# Patient Record
Sex: MALE | Race: WHITE | NOT HISPANIC OR LATINO | Employment: OTHER | URBAN - METROPOLITAN AREA
[De-identification: names, ages, dates, MRNs, and addresses within clinical notes are randomized per-mention and may not be internally consistent; named-entity substitution may affect disease eponyms.]

---

## 2017-07-17 ENCOUNTER — HISTORICAL (OUTPATIENT)
Dept: RADIOLOGY | Facility: HOSPITAL | Age: 82
End: 2017-07-17

## 2018-01-02 ENCOUNTER — HISTORICAL (OUTPATIENT)
Dept: RADIOLOGY | Facility: HOSPITAL | Age: 83
End: 2018-01-02

## 2018-03-26 ENCOUNTER — HISTORICAL (OUTPATIENT)
Dept: RADIOLOGY | Facility: HOSPITAL | Age: 83
End: 2018-03-26

## 2019-07-15 ENCOUNTER — HISTORICAL (OUTPATIENT)
Dept: RADIOLOGY | Facility: HOSPITAL | Age: 84
End: 2019-07-15

## 2019-09-03 ENCOUNTER — HISTORICAL (OUTPATIENT)
Dept: RADIOLOGY | Facility: HOSPITAL | Age: 84
End: 2019-09-03

## 2019-09-11 ENCOUNTER — HISTORICAL (OUTPATIENT)
Dept: RADIOLOGY | Facility: HOSPITAL | Age: 84
End: 2019-09-11

## 2020-08-04 ENCOUNTER — HISTORICAL (OUTPATIENT)
Dept: RADIOLOGY | Facility: HOSPITAL | Age: 85
End: 2020-08-04

## 2022-07-12 ENCOUNTER — HOSPITAL ENCOUNTER (OUTPATIENT)
Facility: HOSPITAL | Age: 87
Discharge: HOME | End: 2022-07-14
Attending: INTERNAL MEDICINE | Admitting: INTERNAL MEDICINE
Payer: MEDICARE

## 2022-07-12 DIAGNOSIS — A41.9 SEPSIS, DUE TO UNSPECIFIED ORGANISM, UNSPECIFIED WHETHER ACUTE ORGAN DYSFUNCTION PRESENT: ICD-10-CM

## 2022-07-12 DIAGNOSIS — R01.1 MURMUR, CARDIAC: ICD-10-CM

## 2022-07-12 DIAGNOSIS — A41.9 SEPSIS: ICD-10-CM

## 2022-07-12 DIAGNOSIS — R07.9 CHEST PAIN: ICD-10-CM

## 2022-07-12 DIAGNOSIS — K59.00 CONSTIPATION, UNSPECIFIED CONSTIPATION TYPE: Primary | ICD-10-CM

## 2022-07-12 DIAGNOSIS — E86.0 DEHYDRATION: ICD-10-CM

## 2022-07-12 DIAGNOSIS — I95.9 HYPOTENSION: ICD-10-CM

## 2022-07-12 DIAGNOSIS — K59.00 CONSTIPATION: ICD-10-CM

## 2022-07-12 PROBLEM — H35.30 MACULAR DEGENERATION: Status: ACTIVE | Noted: 2022-07-12

## 2022-07-12 PROBLEM — I10 HYPERTENSION: Status: ACTIVE | Noted: 2022-07-12

## 2022-07-12 PROBLEM — I65.29 OBSTRUCTION OF CAROTID ARTERY: Status: ACTIVE | Noted: 2022-07-12

## 2022-07-12 PROBLEM — E78.5 HYPERLIPIDEMIA: Status: ACTIVE | Noted: 2022-07-12

## 2022-07-12 PROBLEM — E03.9 HYPOTHYROIDISM: Status: ACTIVE | Noted: 2022-07-12

## 2022-07-12 LAB
ALBUMIN SERPL-MCNC: 3 GM/DL (ref 3.4–4.8)
ALBUMIN/GLOB SERPL: 0.9 RATIO (ref 1.1–2)
ALP SERPL-CCNC: 94 UNIT/L (ref 40–150)
ALT SERPL-CCNC: 25 UNIT/L (ref 0–55)
APPEARANCE UR: CLEAR
AST SERPL-CCNC: 21 UNIT/L (ref 5–34)
BACTERIA #/AREA URNS AUTO: NORMAL /HPF
BASOPHILS # BLD AUTO: 0.03 X10(3)/MCL (ref 0–0.2)
BASOPHILS NFR BLD AUTO: 0.2 %
BILIRUB UR QL STRIP.AUTO: NEGATIVE MG/DL
BILIRUBIN DIRECT+TOT PNL SERPL-MCNC: 0.7 MG/DL
BUN SERPL-MCNC: 42 MG/DL (ref 8.4–25.7)
CALCIUM SERPL-MCNC: 8.6 MG/DL (ref 8.8–10)
CHLORIDE SERPL-SCNC: 114 MMOL/L (ref 98–107)
CO2 SERPL-SCNC: 22 MMOL/L (ref 23–31)
COLOR UR AUTO: YELLOW
CREAT SERPL-MCNC: 1.56 MG/DL (ref 0.73–1.18)
EOSINOPHIL # BLD AUTO: 0.04 X10(3)/MCL (ref 0–0.9)
EOSINOPHIL NFR BLD AUTO: 0.3 %
ERYTHROCYTE [DISTWIDTH] IN BLOOD BY AUTOMATED COUNT: 16.9 % (ref 11.5–17)
FERRITIN SERPL-MCNC: 648.53 NG/ML (ref 21.81–274.66)
FLUAV AG UPPER RESP QL IA.RAPID: NOT DETECTED
FLUBV AG UPPER RESP QL IA.RAPID: NOT DETECTED
FOLATE SERPL-MCNC: 16.3 NG/ML (ref 7–31.4)
GLOBULIN SER-MCNC: 3.5 GM/DL (ref 2.4–3.5)
GLUCOSE SERPL-MCNC: 135 MG/DL (ref 82–115)
GLUCOSE UR QL STRIP.AUTO: NEGATIVE MG/DL
HCT VFR BLD AUTO: 26.1 % (ref 42–52)
HEMOCCULT SP1 STL QL: POSITIVE
HGB BLD-MCNC: 8.3 GM/DL (ref 14–18)
IMM GRANULOCYTES # BLD AUTO: 0.1 X10(3)/MCL (ref 0–0.04)
IMM GRANULOCYTES NFR BLD AUTO: 0.7 %
IRON SATN MFR SERPL: 14 % (ref 20–50)
IRON SERPL-MCNC: 27 UG/DL (ref 65–175)
KETONES UR QL STRIP.AUTO: NEGATIVE MG/DL
LACTATE SERPL-SCNC: 1 MMOL/L (ref 0.5–2.2)
LACTATE SERPL-SCNC: 3.2 MMOL/L (ref 0.5–2.2)
LEUKOCYTE ESTERASE UR QL STRIP.AUTO: NEGATIVE UNIT/L
LYMPHOCYTES # BLD AUTO: 2.52 X10(3)/MCL (ref 0.6–4.6)
LYMPHOCYTES NFR BLD AUTO: 16.7 %
MCH RBC QN AUTO: 32.3 PG (ref 27–31)
MCHC RBC AUTO-ENTMCNC: 31.8 MG/DL (ref 33–36)
MCV RBC AUTO: 101.6 FL (ref 80–94)
MONOCYTES # BLD AUTO: 2.1 X10(3)/MCL (ref 0.1–1.3)
MONOCYTES NFR BLD AUTO: 13.9 %
NEUTROPHILS # BLD AUTO: 10.3 X10(3)/MCL (ref 2.1–9.2)
NEUTROPHILS NFR BLD AUTO: 68.2 %
NITRITE UR QL STRIP.AUTO: NEGATIVE
PH UR STRIP.AUTO: 6.5 [PH]
PLATELET # BLD AUTO: 242 X10(3)/MCL (ref 130–400)
PMV BLD AUTO: 11 FL (ref 7.4–10.4)
POTASSIUM SERPL-SCNC: 5.1 MMOL/L (ref 3.5–5.1)
PROT SERPL-MCNC: 6.5 GM/DL (ref 5.8–7.6)
PROT UR QL STRIP.AUTO: 30 MG/DL
RBC # BLD AUTO: 2.57 X10(6)/MCL (ref 4.7–6.1)
RBC #/AREA URNS AUTO: NORMAL /HPF
RBC UR QL AUTO: NEGATIVE UNIT/L
SARS-COV-2 RNA RESP QL NAA+PROBE: NOT DETECTED
SODIUM SERPL-SCNC: 143 MMOL/L (ref 136–145)
SP GR UR STRIP.AUTO: 1.02
SQUAMOUS #/AREA URNS AUTO: NORMAL /HPF
TIBC SERPL-MCNC: 172 UG/DL (ref 69–240)
TIBC SERPL-MCNC: 199 UG/DL (ref 250–450)
TROPONIN I SERPL-MCNC: <0.01 NG/ML (ref 0–0.04)
TSH SERPL-ACNC: 0.55 UIU/ML (ref 0.35–4.94)
UROBILINOGEN UR STRIP-ACNC: 0.2 MG/DL
WBC # SPEC AUTO: 15.1 X10(3)/MCL (ref 4.5–11.5)
WBC #/AREA URNS AUTO: NORMAL /HPF

## 2022-07-12 PROCEDURE — 82607 VITAMIN B-12: CPT | Performed by: INTERNAL MEDICINE

## 2022-07-12 PROCEDURE — 83540 ASSAY OF IRON: CPT | Performed by: INTERNAL MEDICINE

## 2022-07-12 PROCEDURE — 63600175 PHARM REV CODE 636 W HCPCS: Performed by: INTERNAL MEDICINE

## 2022-07-12 PROCEDURE — C9113 INJ PANTOPRAZOLE SODIUM, VIA: HCPCS | Performed by: INTERNAL MEDICINE

## 2022-07-12 PROCEDURE — 80053 COMPREHEN METABOLIC PANEL: CPT | Performed by: INTERNAL MEDICINE

## 2022-07-12 PROCEDURE — 85060 BLOOD SMEAR INTERPRETATION: CPT | Performed by: INTERNAL MEDICINE

## 2022-07-12 PROCEDURE — 82728 ASSAY OF FERRITIN: CPT | Performed by: INTERNAL MEDICINE

## 2022-07-12 PROCEDURE — A4216 STERILE WATER/SALINE, 10 ML: HCPCS | Performed by: INTERNAL MEDICINE

## 2022-07-12 PROCEDURE — 25000003 PHARM REV CODE 250: Performed by: INTERNAL MEDICINE

## 2022-07-12 PROCEDURE — G0378 HOSPITAL OBSERVATION PER HR: HCPCS

## 2022-07-12 PROCEDURE — 99291 CRITICAL CARE FIRST HOUR: CPT | Mod: 25,CS

## 2022-07-12 PROCEDURE — 82746 ASSAY OF FOLIC ACID SERUM: CPT | Performed by: INTERNAL MEDICINE

## 2022-07-12 PROCEDURE — 84443 ASSAY THYROID STIM HORMONE: CPT | Performed by: INTERNAL MEDICINE

## 2022-07-12 PROCEDURE — 81001 URINALYSIS AUTO W/SCOPE: CPT | Performed by: INTERNAL MEDICINE

## 2022-07-12 PROCEDURE — 82272 OCCULT BLD FECES 1-3 TESTS: CPT | Performed by: INTERNAL MEDICINE

## 2022-07-12 PROCEDURE — 84484 ASSAY OF TROPONIN QUANT: CPT | Performed by: INTERNAL MEDICINE

## 2022-07-12 PROCEDURE — 96375 TX/PRO/DX INJ NEW DRUG ADDON: CPT

## 2022-07-12 PROCEDURE — 96365 THER/PROPH/DIAG IV INF INIT: CPT

## 2022-07-12 PROCEDURE — 36415 COLL VENOUS BLD VENIPUNCTURE: CPT | Performed by: INTERNAL MEDICINE

## 2022-07-12 PROCEDURE — 87040 BLOOD CULTURE FOR BACTERIA: CPT | Performed by: INTERNAL MEDICINE

## 2022-07-12 PROCEDURE — 96361 HYDRATE IV INFUSION ADD-ON: CPT

## 2022-07-12 PROCEDURE — 87636 SARSCOV2 & INF A&B AMP PRB: CPT | Performed by: INTERNAL MEDICINE

## 2022-07-12 PROCEDURE — 85025 COMPLETE CBC W/AUTO DIFF WBC: CPT | Performed by: INTERNAL MEDICINE

## 2022-07-12 PROCEDURE — 83605 ASSAY OF LACTIC ACID: CPT | Performed by: INTERNAL MEDICINE

## 2022-07-12 PROCEDURE — 93005 ELECTROCARDIOGRAM TRACING: CPT

## 2022-07-12 PROCEDURE — 11000001 HC ACUTE MED/SURG PRIVATE ROOM

## 2022-07-12 PROCEDURE — 21400001 HC TELEMETRY ROOM

## 2022-07-12 RX ORDER — ATORVASTATIN CALCIUM 20 MG/1
20 TABLET, FILM COATED ORAL DAILY
Status: DISCONTINUED | OUTPATIENT
Start: 2022-07-13 | End: 2022-07-14 | Stop reason: HOSPADM

## 2022-07-12 RX ORDER — GLUCAGON 1 MG
1 KIT INJECTION
Status: DISCONTINUED | OUTPATIENT
Start: 2022-07-13 | End: 2022-07-14 | Stop reason: HOSPADM

## 2022-07-12 RX ORDER — SODIUM CHLORIDE 9 MG/ML
1000 INJECTION, SOLUTION INTRAVENOUS CONTINUOUS
Status: ACTIVE | OUTPATIENT
Start: 2022-07-12 | End: 2022-07-12

## 2022-07-12 RX ORDER — SODIUM CHLORIDE 0.9 % (FLUSH) 0.9 %
10 SYRINGE (ML) INJECTION EVERY 8 HOURS
Status: DISCONTINUED | OUTPATIENT
Start: 2022-07-12 | End: 2022-07-14 | Stop reason: HOSPADM

## 2022-07-12 RX ORDER — SODIUM CHLORIDE 0.9 % (FLUSH) 0.9 %
10 SYRINGE (ML) INJECTION EVERY 12 HOURS PRN
Status: DISCONTINUED | OUTPATIENT
Start: 2022-07-13 | End: 2022-07-14 | Stop reason: HOSPADM

## 2022-07-12 RX ORDER — HYDROCORTISONE 25 MG/G
CREAM TOPICAL
COMMUNITY
Start: 2022-05-14

## 2022-07-12 RX ORDER — FLUTICASONE PROPIONATE 50 MCG
SPRAY, SUSPENSION (ML) NASAL
COMMUNITY
Start: 2022-05-12

## 2022-07-12 RX ORDER — BISACODYL 10 MG
10 SUPPOSITORY, RECTAL RECTAL ONCE
Status: DISCONTINUED | OUTPATIENT
Start: 2022-07-12 | End: 2022-07-12

## 2022-07-12 RX ORDER — LACTULOSE 10 G/15ML
20 SOLUTION ORAL
Status: COMPLETED | OUTPATIENT
Start: 2022-07-12 | End: 2022-07-12

## 2022-07-12 RX ORDER — PANTOPRAZOLE SODIUM 40 MG/10ML
40 INJECTION, POWDER, LYOPHILIZED, FOR SOLUTION INTRAVENOUS EVERY 12 HOURS
Status: DISCONTINUED | OUTPATIENT
Start: 2022-07-12 | End: 2022-07-14 | Stop reason: HOSPADM

## 2022-07-12 RX ORDER — NALOXONE HCL 0.4 MG/ML
0.02 VIAL (ML) INJECTION
Status: DISCONTINUED | OUTPATIENT
Start: 2022-07-13 | End: 2022-07-14 | Stop reason: HOSPADM

## 2022-07-12 RX ORDER — FAMOTIDINE 20 MG/1
20 TABLET, FILM COATED ORAL DAILY
COMMUNITY
Start: 2022-05-31

## 2022-07-12 RX ORDER — IBUPROFEN 200 MG
16 TABLET ORAL
Status: DISCONTINUED | OUTPATIENT
Start: 2022-07-13 | End: 2022-07-14 | Stop reason: HOSPADM

## 2022-07-12 RX ORDER — IBUPROFEN 200 MG
24 TABLET ORAL
Status: DISCONTINUED | OUTPATIENT
Start: 2022-07-13 | End: 2022-07-14 | Stop reason: HOSPADM

## 2022-07-12 RX ORDER — LEVOTHYROXINE SODIUM 75 UG/1
75 TABLET ORAL DAILY
Status: DISCONTINUED | OUTPATIENT
Start: 2022-07-13 | End: 2022-07-14 | Stop reason: HOSPADM

## 2022-07-12 RX ORDER — LOSARTAN POTASSIUM 100 MG/1
100 TABLET ORAL DAILY
COMMUNITY
Start: 2022-05-12 | End: 2022-07-14

## 2022-07-12 RX ORDER — ACETAMINOPHEN 325 MG/1
650 TABLET ORAL EVERY 8 HOURS PRN
Status: DISCONTINUED | OUTPATIENT
Start: 2022-07-12 | End: 2022-07-14 | Stop reason: HOSPADM

## 2022-07-12 RX ORDER — LEVOTHYROXINE SODIUM 75 UG/1
75 TABLET ORAL DAILY
COMMUNITY
Start: 2022-05-12

## 2022-07-12 RX ORDER — ATORVASTATIN CALCIUM 20 MG/1
20 TABLET, FILM COATED ORAL DAILY
COMMUNITY
Start: 2022-05-12

## 2022-07-12 RX ORDER — SODIUM CHLORIDE 9 MG/ML
INJECTION, SOLUTION INTRAVENOUS CONTINUOUS
Status: DISCONTINUED | OUTPATIENT
Start: 2022-07-12 | End: 2022-07-14 | Stop reason: HOSPADM

## 2022-07-12 RX ORDER — FAMOTIDINE 20 MG/1
20 TABLET, FILM COATED ORAL DAILY
Status: DISCONTINUED | OUTPATIENT
Start: 2022-07-13 | End: 2022-07-14 | Stop reason: HOSPADM

## 2022-07-12 RX ADMIN — PANTOPRAZOLE SODIUM 40 MG: 40 INJECTION, POWDER, FOR SOLUTION INTRAVENOUS at 09:07

## 2022-07-12 RX ADMIN — SODIUM CHLORIDE 1000 ML: 9 INJECTION, SOLUTION INTRAVENOUS at 07:07

## 2022-07-12 RX ADMIN — SODIUM CHLORIDE 1000 ML: 9 INJECTION, SOLUTION INTRAVENOUS at 06:07

## 2022-07-12 RX ADMIN — Medication 10 ML: at 09:07

## 2022-07-12 RX ADMIN — PIPERACILLIN SODIUM AND TAZOBACTAM SODIUM 4.5 G: 4; .5 INJECTION, POWDER, LYOPHILIZED, FOR SOLUTION INTRAVENOUS at 07:07

## 2022-07-12 RX ADMIN — SODIUM CHLORIDE 1000 ML: 9 INJECTION, SOLUTION INTRAVENOUS at 04:07

## 2022-07-12 RX ADMIN — SODIUM CHLORIDE 1000 ML: 9 INJECTION, SOLUTION INTRAVENOUS at 10:07

## 2022-07-12 RX ADMIN — LACTULOSE 20 G: 20 SOLUTION ORAL at 04:07

## 2022-07-12 NOTE — ED PROVIDER NOTES
Encounter Date: 7/12/2022       History     Chief Complaint   Patient presents with    Constipation     Reports unable to have bowel movement in 3 days. No relief with laxatives. States urinary retention this am.      87-year-old white man presents emergency department complaining of being constipated.  He states yesterday he felt constipated so he drank a bottle magnesium citrate and has not had a bowel movement since.  When asked if he drinks any water or other fluids after the magnesium he stated no any does admit to feeling a little dehydrated        Review of patient's allergies indicates:  No Known Allergies  Past Medical History:   Diagnosis Date    Carotid stenosis     Hypercholesterolemia     Hypertension     Hypothyroidism     Macular degeneration      History reviewed. No pertinent surgical history.  History reviewed. No pertinent family history.     Review of Systems   Constitutional: Positive for fatigue. Negative for activity change, appetite change, chills, diaphoresis, fever and unexpected weight change.   HENT: Negative.  Negative for congestion, dental problem, drooling, ear discharge, ear pain, facial swelling, hearing loss, mouth sores, nosebleeds, postnasal drip, rhinorrhea, sinus pressure, sinus pain, sneezing, sore throat, tinnitus, trouble swallowing and voice change.    Eyes: Negative.  Negative for photophobia, pain, discharge, redness, itching and visual disturbance.   Respiratory: Negative.  Negative for apnea, cough, choking, chest tightness, shortness of breath, wheezing and stridor.    Cardiovascular: Negative.  Negative for chest pain, palpitations and leg swelling.   Gastrointestinal: Positive for constipation. Negative for abdominal distention, abdominal pain, anal bleeding, blood in stool, diarrhea, nausea, rectal pain and vomiting.   Endocrine: Negative.  Negative for cold intolerance, heat intolerance, polydipsia, polyphagia and polyuria.   Genitourinary: Negative.   Negative for decreased urine volume, difficulty urinating, dysuria, enuresis, flank pain, frequency, genital sores, hematuria, penile discharge, penile pain, penile swelling, scrotal swelling, testicular pain and urgency.   Musculoskeletal: Negative.  Negative for arthralgias, back pain, gait problem, joint swelling, myalgias, neck pain and neck stiffness.   Skin: Negative.  Negative for color change, pallor, rash and wound.   Allergic/Immunologic: Negative.  Negative for environmental allergies, food allergies and immunocompromised state.   Neurological: Negative.  Negative for dizziness, tremors, seizures, syncope, facial asymmetry, speech difficulty, weakness, light-headedness, numbness and headaches.   Hematological: Negative.  Negative for adenopathy. Does not bruise/bleed easily.   Psychiatric/Behavioral: Negative.  Negative for agitation, behavioral problems, confusion, decreased concentration, dysphoric mood, hallucinations, self-injury, sleep disturbance and suicidal ideas. The patient is not nervous/anxious and is not hyperactive.    All other systems reviewed and are negative.      Physical Exam     Initial Vitals [07/12/22 0419]   BP Pulse Resp Temp SpO2   (!) 70/42 98 16 96.6 °F (35.9 °C) 96 %      MAP       --         Physical Exam    Nursing note and vitals reviewed.  Constitutional: He appears well-developed and well-nourished.   HENT:   Head: Normocephalic and atraumatic.   Dry mucous membranes   Eyes: Conjunctivae and EOM are normal. Pupils are equal, round, and reactive to light.   Neck: Neck supple.   Normal range of motion.  Cardiovascular: Normal rate and regular rhythm.   Pulmonary/Chest: Breath sounds normal.   Abdominal: Abdomen is soft.   Hyperactive bowel sounds   Musculoskeletal:         General: Normal range of motion.      Cervical back: Normal range of motion and neck supple.     Neurological: He is alert and oriented to person, place, and time.   Skin: Skin is warm and dry. Capillary  refill takes less than 2 seconds.   Poor skin turgor   Psychiatric: He has a normal mood and affect. His behavior is normal. Judgment and thought content normal.         ED Course   Critical Care    Date/Time: 7/12/2022 10:04 AM  Performed by: Alvina Carolina MD  Authorized by: Alvina Carolina MD   Direct patient critical care time: 65 minutes  Total critical care time (exclusive of procedural time) : 65 minutes  Critical care was necessary to treat or prevent imminent or life-threatening deterioration of the following conditions: dehydration and circulatory failure.  Critical care was time spent personally by me on the following activities: discussions with primary provider, development of treatment plan with patient or surrogate, evaluation of patient's response to treatment, examination of patient, ordering and review of laboratory studies, ordering and review of radiographic studies and re-evaluation of patient's condition.  Comments: I performed the critical care portion entirely myself. Alvina Carolina MD.        Orders Placed This Encounter    Critical Care    Blood culture #1 **CANNOT BE ORDERED STAT**    Blood culture #2 **CANNOT BE ORDERED STAT**    X-Ray Abdomen Flat And Erect    X-Ray Chest 1 View    CT Renal Stone Study ABD Pelvis WO    Urinalysis, Reflex to Urine Culture Urine, Clean Catch    CBC auto differential    Comprehensive metabolic panel    Lactic acid, plasma    Troponin I    TSH    CBC with Differential    Urinalysis, Microscopic    Occult blood x 3, stool    Lactic Acid, Plasma    COVID/FLU A&B PCR    Catheterization for spec collection    Vital Signs    EKG 12-lead    Insert peripheral IV    Admit to Inpatient    lactulose 20 gram/30 mL solution Soln 20 g    sodium chloride 0.9% bolus 1,000 mL    sodium chloride 0.9% bolus 1,000 mL    sodium chloride 0.9% bolus 1,000 mL    piperacillin-tazobactam (ZOSYN) 4.5 g in sodium chloride 0.9 % 100 mL IVPB (MB+)     0.9%  NaCl infusion         Admission on 07/12/2022   Component Date Value Ref Range Status    Color, UA 07/12/2022 Yellow  Yellow, Colorless, Other, Clear Final    Appearance, UA 07/12/2022 Clear  Clear Final    Specific Gravity, UA 07/12/2022 1.020   Final    pH, UA 07/12/2022 6.5  5.0, 5.5, 6.0, 6.5, 7.0, 7.5, 8.0, 8.5 Final    Protein, UA 07/12/2022 30  (A) Negative, 300  mg/dL Final    Glucose, UA 07/12/2022 Negative  Negative, Normal mg/dL Final    Ketones, UA 07/12/2022 Negative  Negative, +1, +2, +3, +4, +5, >=160, >=80 mg/dL Final    Blood, UA 07/12/2022 Negative  Negative unit/L Final    Bilirubin, UA 07/12/2022 Negative  Negative mg/dL Final    Urobilinogen, UA 07/12/2022 0.2  0.2, 1.0, Normal mg/dL Final    Nitrites, UA 07/12/2022 Negative  Negative Final    Leukocyte Esterase, UA 07/12/2022 Negative  Negative, 75  unit/L Final    Sodium Level 07/12/2022 143  136 - 145 mmol/L Final    Potassium Level 07/12/2022 5.1  3.5 - 5.1 mmol/L Final    Chloride 07/12/2022 114 (A) 98 - 107 mmol/L Final    Carbon Dioxide 07/12/2022 22 (A) 23 - 31 mmol/L Final    Glucose Level 07/12/2022 135 (A) 82 - 115 mg/dL Final    Blood Urea Nitrogen 07/12/2022 42.0 (A) 8.4 - 25.7 mg/dL Final    Creatinine 07/12/2022 1.56 (A) 0.73 - 1.18 mg/dL Final    Calcium Level Total 07/12/2022 8.6 (A) 8.8 - 10.0 mg/dL Final    Protein Total 07/12/2022 6.5  5.8 - 7.6 gm/dL Final    Albumin Level 07/12/2022 3.0 (A) 3.4 - 4.8 gm/dL Final    Globulin 07/12/2022 3.5  2.4 - 3.5 gm/dL Final    Albumin/Globulin Ratio 07/12/2022 0.9 (A) 1.1 - 2.0 ratio Final    Bilirubin Total 07/12/2022 0.7  <=1.5 mg/dL Final    Alkaline Phosphatase 07/12/2022 94  40 - 150 unit/L Final    Alanine Aminotransferase 07/12/2022 25  0 - 55 unit/L Final    Aspartate Aminotransferase 07/12/2022 21  5 - 34 unit/L Final    Estimated GFR-Non  07/12/2022 45  mls/min/1.73/m2 Final    Lactic Acid Level 07/12/2022 3.2 (A) 0.5 - 2.2  mmol/L Final    Troponin-I 07/12/2022 <0.010  0.000 - 0.045 ng/mL Final    Thyroid Stimulating Hormone 07/12/2022 0.5543  0.3500 - 4.9400 uIU/mL Final    WBC 07/12/2022 15.1 (A) 4.5 - 11.5 x10(3)/mcL Final    RBC 07/12/2022 2.57 (A) 4.70 - 6.10 x10(6)/mcL Final    Hgb 07/12/2022 8.3 (A) 14.0 - 18.0 gm/dL Final    Hct 07/12/2022 26.1 (A) 42.0 - 52.0 % Final    MCV 07/12/2022 101.6 (A) 80.0 - 94.0 fL Final    MCH 07/12/2022 32.3 (A) 27.0 - 31.0 pg Final    MCHC 07/12/2022 31.8 (A) 33.0 - 36.0 mg/dL Final    RDW 07/12/2022 16.9  11.5 - 17.0 % Final    Platelet 07/12/2022 242  130 - 400 x10(3)/mcL Final    MPV 07/12/2022 11.0 (A) 7.4 - 10.4 fL Final    Neut % 07/12/2022 68.2  % Final    Lymph % 07/12/2022 16.7  % Final    Mono % 07/12/2022 13.9  % Final    Eos % 07/12/2022 0.3  % Final    Basophil % 07/12/2022 0.2  % Final    Lymph # 07/12/2022 2.52  0.6 - 4.6 x10(3)/mcL Final    Neut # 07/12/2022 10.3 (A) 2.1 - 9.2 x10(3)/mcL Final    Mono # 07/12/2022 2.10 (A) 0.1 - 1.3 x10(3)/mcL Final    Eos # 07/12/2022 0.04  0 - 0.9 x10(3)/mcL Final    Baso # 07/12/2022 0.03  0 - 0.2 x10(3)/mcL Final    IG# 07/12/2022 0.10 (A) 0 - 0.04 x10(3)/mcL Final    IG% 07/12/2022 0.7  % Final    Bacteria, UA 07/12/2022 None Seen  None Seen, Rare, Occasional /HPF Final    RBC, UA 07/12/2022 0-2  None Seen, 0-2, 3-5, 0-5 /HPF Final    WBC, UA 07/12/2022 None Seen  None Seen, 0-2, 3-5, 0-5 /HPF Final    Squamous Epithelial Cells, UA 07/12/2022 Rare  None Seen, Rare, Occasional, Occ /HPF Final    Occult Blood Stool 1 07/12/2022 Positive (A) Negative Final    Lactic Acid Level 07/12/2022 1.0  0.5 - 2.2 mmol/L Final    Influenza A PCR 07/12/2022 Not Detected  Not Detected Final    Influenza B PCR 07/12/2022 Not Detected  Not Detected Final    SARS-CoV-2 PCR 07/12/2022 Not Detected  Not Detected Final          ECG Results          EKG 12-lead (Preliminary result)  Result time 07/12/22 07:35:21    ED Interpretation  by Alvina Carolina MD (07/12/22 07:35:21, [facility] - Emergency Dept, Emergency Medicine)    EKG: Interpreted by Avlina Carolina MD. independently as Normal Sinus Rhythm, Rate 92, Normal Axis, Normal Intervals., Nonspecific ST-T changes                              Imaging Results          CT Renal Stone Study ABD Pelvis WO (Final result)  Result time 07/12/22 08:29:48    Final result by Jace Rose MD (07/12/22 08:29:48)                 Impression:      1. Motion artifact which limits the exam  2. Findings of constipation with large dense fecal bolus at the rectum and gas distended cecum measuring just under 10 cm.  3. Nonobstructing pinpoint calcification upper left kidney  4. Limited evaluation for renal infarct/ischemia without the administration of IV contrast.  5. Extensive atherosclerosis  6. Extensive hazy reticular and ground-glass interstitial opacities throughout the lung bases  7. Gas distended distal esophagus/small hiatus hernia  8. Nondistended gallbladder  9. Mason catheter present within a nondistended bladder  10. Thoracolumbar spondylosis with right compression form at T12 and defect at the inferior endplate of L4      Electronically signed by: Jace Rose  Date:    07/12/2022  Time:    08:29             Narrative:    EXAMINATION:  CT RENAL STONE STUDY ABD PELVIS WO    CLINICAL HISTORY:  Renal ischemia or infarction;, constipation.    TECHNIQUE:  PATIENT RADIATION DOSE: DLP(mGycm) 259.40    As per PQRS measures, all CT scans at this facility used dose modulation, iterative reconstruction, and/or weight based dose adjustment when appropriate to reduce radiation dose to as low as reasonably achievable.    COMPARISON:  None available    FINDINGS:  Serial axial images obtained of the abdomen without the administration of IV or oral contrast.  Additional sagittal and coronal reconstructions were performed.Degenerative changes are noted to the thoracolumbar spine.  There is  anterior wedging evident at T12.  There is a bony defect at the inferior endplate of L4.  Bony structures are osteopenic.  The heart is normal in size.  Hazy reticular and ground-glass interstitial opacities are evident throughout the lungs bilaterally.  There is gaseous distension of the distal esophagus/small hiatus hernia.  The liver, spleen, adrenal glands, and pancreas grossly within normal limits.  There is motion artifact.  There is mucosal prominence versus underdistention at the stomach.  The gallbladder is incompletely distended.  Atherosclerosis is seen within the aorta and branching vessels.  The kidneys are relatively symmetric in size.  No hydronephrosis is seen.  There is mild bilateral perinephric stranding.  A nonobstructing pinpoint  calcification is is evident at the upper left kidney.  A few small lymph nodes seen within the periaortic pericaval region.  Gas and feces are scattered throughout the colon.  There is dense feces throughout the left side of the colon up to the rectum with a dental fecal bolus present.  A Mason catheter is present within a nondistended bladder.  No significant free fluid collection is identified.  The appendix is not identified with certainty.  Evaluation is limited.  There is tortuosity and redundancy of the sigmoid colon.  The cecum appears distended with gas measuring 9.5 cm in diameter.                               X-Ray Chest 1 View (Final result)  Result time 07/12/22 10:26:07    Final result by Jace Rose MD (07/12/22 10:26:07)                 Impression:      1. Scattered patchy hazy opacities throughout the lungs bilaterally which have mildly progressed since the prior exam suggesting a acute infiltrate component superimposed on chronic interstitial changes      Electronically signed by: Jace Rose  Date:    07/12/2022  Time:    10:26             Narrative:    EXAMINATION:  XR CHEST 1 VIEW    CLINICAL HISTORY:  , Hypotension,  unspecified.    COMPARISON:  08/04/2020    FINDINGS:  An AP view or more reveals the heart to be normal in size.  Scattered patchy hazy opacities are evident throughout the lungs bilaterally which have mildly progressed since the prior exam.  No consolidative infiltrate or effusion is seen.  Bony structures are osteopenic.                    ED Interpretation by Alvina Carolina MD (07/12/22 07:52:14, [facility] - Emergency Dept, Emergency Medicine)    Chest one view:  Bilateral Haziness in lung fields more in the periphery, ?? Interstitial lung disease vs COVID-19.                             X-Ray Abdomen Flat And Erect (Final result)  Result time 07/12/22 08:41:31    Final result by Jace Rose MD (07/12/22 08:41:31)                 Impression:      1. Findings of constipation  2. Thoracolumbar spondylosis and scoliosis  3. Osteopenia      Electronically signed by: Jace Rose  Date:    07/12/2022  Time:    08:41             Narrative:    EXAMINATION:  XR ABDOMEN FLAT AND ERECT    CLINICAL HISTORY:  XR ABDOMEN FLAT AND ERECT, Constipation, unspecified.    COMPARISON:  None available    FINDINGS:  Upright and supine views reveal a nonspecific bowel gas pattern without evidence of obstruction. No free air is seen outside of bowel lumen. Gas and feces are seen within the colon.  A fecal bolus is present within the rectum.  Bony structures are osteopenic.  Degenerative changes and curvature noted to the thoracolumbar spine.                    ED Interpretation by Martin Cook MD (07/12/22 05:41:02, [facility] - Emergency Dept, Emergency Medicine)    Nonspecific bowel gas pattern with no evidence of obstruction or perforation                               Medications   0.9%  NaCl infusion (1,000 mLs Intravenous New Bag 7/12/22 1049)   lactulose 20 gram/30 mL solution Soln 20 g (20 g Oral Given 7/12/22 0312)   sodium chloride 0.9% bolus 1,000 mL (0 mLs Intravenous Stopped 7/12/22  0615)   sodium chloride 0.9% bolus 1,000 mL (0 mLs Intravenous Stopped 7/12/22 0743)   sodium chloride 0.9% bolus 1,000 mL (0 mLs Intravenous Stopped 7/12/22 0711)   piperacillin-tazobactam (ZOSYN) 4.5 g in sodium chloride 0.9 % 100 mL IVPB (MB+) (0 g Intravenous Stopped 7/12/22 0819)                 ED Course as of 07/12/22 1122   Tue Jul 12, 2022   0530 After L of fluids patient's blood pressure is markedly improved to 1 0 1/62 and patient feels like he needs to have a bowel movement gave him 20 g of lactulose just after arrival and I discussed with him that he drinks a bottle of magnesium citrated or takes any other type of laxative he needs to drink plenty fluids to help the laxative work [PL]   0553 Patient returned from the restroom stating he is having difficulty urinating and could not produce any urine and he had 1 small stool to use his words nothing to be proud of [PL]   0615 I am Dr. Carolina assumed the care of patient at 6:00 a.m. patient presented to the emergency room with complaint of constipation for the last 2 days, initially his blood pressure was low, they gave him IV fluids, gave him Dulcolax and lactulose and he did have a small bowel movement and I arrived patient was in the toilet.  When he came out we checked his blood pressure and it was again 50 systolic, but is not lightheaded dizzy or sweating.  Once he lays down his blood pressure comes up to 101 systolic so essentially he is significantly orthostatic, also on examination he has a bladder up to his umbilicus, rest of the abdomen is soft and nondistended and nontender.  Patient reports that is probably his medicine which keeps him hypotensive.  Because his blood pressure has been running low for a long time.  We decided to catheterize the patient to relieve his urinary retention.  And re-evaluate.  Also decided to give him another L of normal saline bolus and do the whole workup and decide further. [GQ]   0631 BP(!): 101/59 [GQ]   0631  Pulse: 105 [GQ]   0631 Resp: 18 [GQ]   0631 SpO2: 98 %  Blood pressure laying down.    I was able to disimpact his rectum manually, and also nurse inserted Mason's and pt. Drained out 1100 ml and pt's bladder is down now. Pt. Still denies any lightheadedness, dizziness or any thing else. [GQ]   0710 Checked again and pt. Is still orthostatic, Blood pressure drops to 50 systolic when he stands, Will give another litre of NS bolus and then decide about pressers. [GQ]   0712 Lactate, Vinh(!): 3.2 [GQ]   0713 WBC(!): 15.1 [GQ]   0713 Hemoglobin(!): 8.3 [GQ]   0713 Hematocrit(!): 26.1 [GQ]   0713 BUN(!): 42.0 [GQ]   0713 Creatinine(!): 1.56 [GQ]   0713 CO2(!): 22  1 litre NS bolus started again. [GQ]   0714 Will start him on Antibiotic Broad spectrum and get CT abd. And pelvis done and decide further. [GQ]   0954 Patient is doing better, he is still awake alert oriented x3, his blood pressure is maintained now more than 100 systolic, he has gotten 30 mL/kg bolus for sepsis, his lactic acid has come down, he does have significant dehydration, his COVID test is negative but he does have bilateral diffuse infiltrates, his oxygen saturation is in normal range on room air, since he was hypotensive, dehydrated, and elevated lactic acid, decided to admit him in the hospital for further management.  I will talk to his family doctor about admission. [GQ]   0956 Lactate, Vinh: 1.0 [GQ]   1000 Talked to Dr. Marsh, says OK to admit, but usually his blood pressure runs low and he is already known to have Anemia, Will admit him and let him decide further. [GQ]   1038 BP: 128/60 [GQ]   1038 Pulse: 90 [GQ]   1038 SpO2: 97 %  As long as patient is lying down his blood pressure is maintained more than 100 systolic, is still when he stands up his blood pressure drops cell continue giving him IV fluids and admit him in the hospital.  Dr. Marsh says that his blood pressure usually runs low [GQ]   1122 BP: 117/61 [GQ]   1122 Pulse: 89 [GQ]    1122 SpO2: 97 %  Patient's admission vital signs are stable at this time. [GQ]      ED Course User Index  [GQ] Alvina Carolina MD  [PL] Martin Cook MD             Clinical Impression:   Final diagnoses:  [K59.00] Constipation  [K59.00] Constipation, unspecified constipation type (Primary)  [E86.0] Dehydration  [I95.9] Hypotension  [A41.9] Sepsis, due to unspecified organism, unspecified whether acute organ dysfunction present  [A41.9] Sepsis          ED Disposition Condition    Admit               Alvina Carolina MD  07/12/22 1006       Alvina Carolina MD  07/12/22 1009       Alvina Carolina MD  07/12/22 1040       Alvina Carolina MD  07/12/22 1122

## 2022-07-13 LAB
ABO + RH BLD: NORMAL
ABO + RH BLD: NORMAL
ALBUMIN SERPL-MCNC: 2.4 GM/DL (ref 3.4–4.8)
ALBUMIN/GLOB SERPL: 0.8 RATIO (ref 1.1–2)
ALP SERPL-CCNC: 77 UNIT/L (ref 40–150)
ALT SERPL-CCNC: 22 UNIT/L (ref 0–55)
AORTIC ROOT ANNULUS: 0 CM
AORTIC VALVE CUSP SEPERATION: 9.8 CM
AST SERPL-CCNC: 23 UNIT/L (ref 5–34)
AV INDEX (PROSTH): 0.7
AV MEAN GRADIENT: 5 MMHG
AV PEAK GRADIENT: 9 MMHG
AV REGURGITATION PRESSURE HALF TIME: 299.06 MS
AV VALVE AREA: 2.16 CM2
AV VELOCITY RATIO: 0.72
BASOPHILS # BLD AUTO: 0.05 X10(3)/MCL (ref 0–0.2)
BASOPHILS NFR BLD AUTO: 0.3 %
BILIRUBIN DIRECT+TOT PNL SERPL-MCNC: 0.7 MG/DL
BLD PROD TYP BPU: NORMAL
BLD PROD TYP BPU: NORMAL
BLOOD UNIT EXPIRATION DATE: NORMAL
BLOOD UNIT EXPIRATION DATE: NORMAL
BLOOD UNIT TYPE CODE: 7300
BLOOD UNIT TYPE CODE: 7300
BSA FOR ECHO PROCEDURE: 1.71 M2
BUN SERPL-MCNC: 32 MG/DL (ref 8.4–25.7)
CALCIUM SERPL-MCNC: 8 MG/DL (ref 8.8–10)
CHLORIDE SERPL-SCNC: 116 MMOL/L (ref 98–107)
CO2 SERPL-SCNC: 21 MMOL/L (ref 23–31)
CREAT SERPL-MCNC: 1.53 MG/DL (ref 0.73–1.18)
CROSSMATCH INTERPRETATION: NORMAL
CROSSMATCH INTERPRETATION: NORMAL
CV ECHO LV RWT: 0.5 CM
DISPENSE STATUS: NORMAL
DISPENSE STATUS: NORMAL
DOP CALC AO PEAK VEL: 1.53 M/S
DOP CALC AO VTI: 34.3 CM
DOP CALC LVOT AREA: 3.1 CM2
DOP CALC LVOT DIAMETER: 1.98 CM
DOP CALC LVOT PEAK VEL: 1.1 M/S
DOP CALC LVOT STROKE VOLUME: 74.17 CM3
DOP CALC MV VTI: 27.5 CM
DOP CALCLVOT PEAK VEL VTI: 24.1 CM
E WAVE DECELERATION TIME: 200.13 MSEC
E/A RATIO: 1.23
ECHO LV POSTERIOR WALL: 1.05 CM (ref 0.6–1.1)
EJECTION FRACTION: 78 %
EOSINOPHIL # BLD AUTO: 0.07 X10(3)/MCL (ref 0–0.9)
EOSINOPHIL NFR BLD AUTO: 0.5 %
ERYTHROCYTE [DISTWIDTH] IN BLOOD BY AUTOMATED COUNT: 17.2 % (ref 11.5–17)
FRACTIONAL SHORTENING: 47 % (ref 28–44)
GLOBULIN SER-MCNC: 2.9 GM/DL (ref 2.4–3.5)
GLUCOSE SERPL-MCNC: 107 MG/DL (ref 82–115)
GROUP & RH: NORMAL
HCT VFR BLD AUTO: 24 % (ref 42–52)
HGB BLD-MCNC: 7.5 GM/DL (ref 14–18)
IMM GRANULOCYTES # BLD AUTO: 0.11 X10(3)/MCL (ref 0–0.04)
IMM GRANULOCYTES NFR BLD AUTO: 0.8 %
INDIRECT COOMBS GEL: NORMAL
INTERVENTRICULAR SEPTUM: 1.03 CM (ref 0.6–1.1)
LEFT ATRIUM SIZE: 4.55 CM
LEFT INTERNAL DIMENSION IN SYSTOLE: 2.23 CM (ref 2.1–4)
LEFT VENTRICLE DIASTOLIC VOLUME INDEX: 44.86 ML/M2
LEFT VENTRICLE DIASTOLIC VOLUME: 77.16 ML
LEFT VENTRICLE MASS INDEX: 83 G/M2
LEFT VENTRICLE SYSTOLIC VOLUME INDEX: 9.7 ML/M2
LEFT VENTRICLE SYSTOLIC VOLUME: 16.72 ML
LEFT VENTRICULAR INTERNAL DIMENSION IN DIASTOLE: 4.17 CM (ref 3.5–6)
LEFT VENTRICULAR MASS: 143.4 G
LVOT MG: 2.75 MMHG
LVOT MV: 0.78 CM/S
LYMPHOCYTES # BLD AUTO: 2.99 X10(3)/MCL (ref 0.6–4.6)
LYMPHOCYTES NFR BLD AUTO: 20.9 %
MAGNESIUM SERPL-MCNC: 2.6 MG/DL (ref 1.6–2.6)
MCH RBC QN AUTO: 31.8 PG (ref 27–31)
MCHC RBC AUTO-ENTMCNC: 31.3 MG/DL (ref 33–36)
MCV RBC AUTO: 101.7 FL (ref 80–94)
MONOCYTES # BLD AUTO: 2.12 X10(3)/MCL (ref 0.1–1.3)
MONOCYTES NFR BLD AUTO: 14.8 %
MV MEAN GRADIENT: 2 MMHG
MV PEAK A VEL: 0.69 M/S
MV PEAK E VEL: 0.85 M/S
MV PEAK GRADIENT: 4 MMHG
MV STENOSIS PRESSURE HALF TIME: 58.04 MS
MV VALVE AREA BY CONTINUITY EQUATION: 2.7 CM2
MV VALVE AREA P 1/2 METHOD: 3.79 CM2
NEUTROPHILS # BLD AUTO: 9 X10(3)/MCL (ref 2.1–9.2)
NEUTROPHILS NFR BLD AUTO: 62.7 %
PHOSPHATE SERPL-MCNC: 2.5 MG/DL (ref 2.3–4.7)
PISA AR MAX VEL: 3.05 M/S
PISA MRMAX VEL: 2.65 M/S
PISA TR MAX VEL: 2.98 M/S
PLATELET # BLD AUTO: 229 X10(3)/MCL (ref 130–400)
PMV BLD AUTO: 11.3 FL (ref 7.4–10.4)
POTASSIUM SERPL-SCNC: 4.5 MMOL/L (ref 3.5–5.1)
PROT SERPL-MCNC: 5.3 GM/DL (ref 5.8–7.6)
PSA SERPL-MCNC: 1.5 NG/ML
PV PEAK VELOCITY: 0.8 CM/S
RBC # BLD AUTO: 2.36 X10(6)/MCL (ref 4.7–6.1)
RIGHT VENTRICULAR END-DIASTOLIC DIMENSION: 2.89 CM
SODIUM SERPL-SCNC: 142 MMOL/L (ref 136–145)
TR MAX PG: 36 MMHG
TRICUSPID VALVE PEAK A WAVE VELOCITY: 0 M/S
TV PEAK E VEL: 0 M/S
TV STENOSIS PRESSURE HALF TIME: 0 MS
UNIT NUMBER: NORMAL
UNIT NUMBER: NORMAL
VIT B12 SERPL-MCNC: 387 PG/ML (ref 213–816)
VIT B12 SERPL-MCNC: 478 PG/ML (ref 213–816)
WBC # SPEC AUTO: 14.3 X10(3)/MCL (ref 4.5–11.5)

## 2022-07-13 PROCEDURE — 51702 INSERT TEMP BLADDER CATH: CPT

## 2022-07-13 PROCEDURE — 84100 ASSAY OF PHOSPHORUS: CPT | Performed by: INTERNAL MEDICINE

## 2022-07-13 PROCEDURE — 80053 COMPREHEN METABOLIC PANEL: CPT | Performed by: INTERNAL MEDICINE

## 2022-07-13 PROCEDURE — 36430 TRANSFUSION BLD/BLD COMPNT: CPT | Mod: 59

## 2022-07-13 PROCEDURE — 25000003 PHARM REV CODE 250: Performed by: INTERNAL MEDICINE

## 2022-07-13 PROCEDURE — 96376 TX/PRO/DX INJ SAME DRUG ADON: CPT | Mod: 59

## 2022-07-13 PROCEDURE — 96367 TX/PROPH/DG ADDL SEQ IV INF: CPT

## 2022-07-13 PROCEDURE — G0378 HOSPITAL OBSERVATION PER HR: HCPCS

## 2022-07-13 PROCEDURE — 85025 COMPLETE CBC W/AUTO DIFF WBC: CPT | Performed by: INTERNAL MEDICINE

## 2022-07-13 PROCEDURE — 86920 COMPATIBILITY TEST SPIN: CPT | Mod: 59 | Performed by: INTERNAL MEDICINE

## 2022-07-13 PROCEDURE — 63600175 PHARM REV CODE 636 W HCPCS: Mod: JG | Performed by: INTERNAL MEDICINE

## 2022-07-13 PROCEDURE — A4216 STERILE WATER/SALINE, 10 ML: HCPCS | Performed by: INTERNAL MEDICINE

## 2022-07-13 PROCEDURE — 86850 RBC ANTIBODY SCREEN: CPT | Performed by: INTERNAL MEDICINE

## 2022-07-13 PROCEDURE — 63600175 PHARM REV CODE 636 W HCPCS: Performed by: INTERNAL MEDICINE

## 2022-07-13 PROCEDURE — 82607 VITAMIN B-12: CPT | Performed by: INTERNAL MEDICINE

## 2022-07-13 PROCEDURE — C9113 INJ PANTOPRAZOLE SODIUM, VIA: HCPCS | Performed by: INTERNAL MEDICINE

## 2022-07-13 PROCEDURE — 84153 ASSAY OF PSA TOTAL: CPT | Performed by: INTERNAL MEDICINE

## 2022-07-13 PROCEDURE — 99900035 HC TECH TIME PER 15 MIN (STAT)

## 2022-07-13 PROCEDURE — 83735 ASSAY OF MAGNESIUM: CPT | Performed by: INTERNAL MEDICINE

## 2022-07-13 PROCEDURE — 94761 N-INVAS EAR/PLS OXIMETRY MLT: CPT

## 2022-07-13 PROCEDURE — 36415 COLL VENOUS BLD VENIPUNCTURE: CPT | Performed by: INTERNAL MEDICINE

## 2022-07-13 PROCEDURE — 21400001 HC TELEMETRY ROOM

## 2022-07-13 PROCEDURE — 96366 THER/PROPH/DIAG IV INF ADDON: CPT | Mod: 59

## 2022-07-13 PROCEDURE — P9016 RBC LEUKOCYTES REDUCED: HCPCS | Performed by: INTERNAL MEDICINE

## 2022-07-13 RX ORDER — ALBUTEROL SULFATE 90 UG/1
2 AEROSOL, METERED RESPIRATORY (INHALATION) EVERY 6 HOURS PRN
Status: DISCONTINUED | OUTPATIENT
Start: 2022-07-13 | End: 2022-07-14 | Stop reason: HOSPADM

## 2022-07-13 RX ORDER — ALBUTEROL SULFATE 90 UG/1
2 AEROSOL, METERED RESPIRATORY (INHALATION) EVERY 8 HOURS
Status: DISCONTINUED | OUTPATIENT
Start: 2022-07-13 | End: 2022-07-13

## 2022-07-13 RX ORDER — HYDROCODONE BITARTRATE AND ACETAMINOPHEN 500; 5 MG/1; MG/1
TABLET ORAL
Status: DISCONTINUED | OUTPATIENT
Start: 2022-07-13 | End: 2022-07-14 | Stop reason: HOSPADM

## 2022-07-13 RX ORDER — TAMSULOSIN HYDROCHLORIDE 0.4 MG/1
0.4 CAPSULE ORAL DAILY
Status: DISCONTINUED | OUTPATIENT
Start: 2022-07-13 | End: 2022-07-14 | Stop reason: HOSPADM

## 2022-07-13 RX ADMIN — FAMOTIDINE 20 MG: 20 TABLET ORAL at 10:07

## 2022-07-13 RX ADMIN — TAMSULOSIN HYDROCHLORIDE 0.4 MG: 0.4 CAPSULE ORAL at 02:07

## 2022-07-13 RX ADMIN — SODIUM CHLORIDE: 9 INJECTION, SOLUTION INTRAVENOUS at 06:07

## 2022-07-13 RX ADMIN — SODIUM CHLORIDE 100 MG: 0.9 INJECTION, SOLUTION INTRAVENOUS at 05:07

## 2022-07-13 RX ADMIN — PANTOPRAZOLE SODIUM 40 MG: 40 INJECTION, POWDER, FOR SOLUTION INTRAVENOUS at 09:07

## 2022-07-13 RX ADMIN — PANTOPRAZOLE SODIUM 40 MG: 40 INJECTION, POWDER, FOR SOLUTION INTRAVENOUS at 10:07

## 2022-07-13 RX ADMIN — PIPERACILLIN SODIUM AND TAZOBACTAM SODIUM 4.5 G: 4; .5 INJECTION, POWDER, LYOPHILIZED, FOR SOLUTION INTRAVENOUS at 01:07

## 2022-07-13 RX ADMIN — PIPERACILLIN SODIUM AND TAZOBACTAM SODIUM 4.5 G: 4; .5 INJECTION, POWDER, LYOPHILIZED, FOR SOLUTION INTRAVENOUS at 09:07

## 2022-07-13 RX ADMIN — ATORVASTATIN CALCIUM 20 MG: 20 TABLET, FILM COATED ORAL at 10:07

## 2022-07-13 RX ADMIN — LEVOTHYROXINE SODIUM 75 MCG: 75 TABLET ORAL at 10:07

## 2022-07-13 RX ADMIN — Medication 10 ML: at 09:07

## 2022-07-13 NOTE — H&P
[facility]                     [address] [facility] [address]    PATIENT NAME:       TONY HINKLE  YOB: 1934  CSN:                [id number]   MRN:                [id number]  ADMIT DATE:         07/12/2022 04:17:00  PHYSICIAN:          James aMrsh MD                        HISTORY AND PHYSICAL      CODE STATUS:  Full code.    CHIEF COMPLAINT:  Patient came to the emergency room complaining of not being   able to pass stool for last 3 days.    HISTORY OF PRESENT ILLNESS:  Mr. Tony Hinkle is a very pleasant   87-year-old gentleman who is still very active and has been a  in   the past and still takes odd jobs and works at home and mostly outside in his   yard and his workshop, came to the emergency room because of not being able to   pass stool for the last 3 days.  He did say that he had taken some magnesium   citrate, and he did not have any bowel movement.  He had no other   system-specific complaint.  When the ER doctor worked up the patient, patient   was found to have extreme hypotension with orthostatic changes, which were   present, and normally his blood pressure runs low because he is allergic to ACE   inhibitors, which is not mentioned in his allergies, and we will be adding.  He   was given losartan, and he has been taking it infrequently, and as I mentioned   above, he likes to work with the engines.  Most likely, he did not keep up with   the water intake or the fluid intake, and as a result, patient was dehydrated.    Because of his severe hypotension in spite of challenges with crystalloids, did   not respond as far as his systolic blood pressure was concerned because it was   in 50s and 60s, and though the patient was comfortable, he was not tachycardic,   and had no other system-specific complaint, the initial lactic acid being high,   but later on came down, sepsis was  suspected.  Antibiotics were given.    Crystalloids were also administered.  Patient was admitted because of persistent   orthostatic hypotension.  Besides that, patient did not have any other   system-specific complaint.    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  He used   to have chronic cough.  It was because of the ACE inhibitors, and they were   changed to ARB.  He also has a history of anemia, which I have worked up in the   past in detail.  Dr. Ruiz as well as Dr. 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  His hemoglobin and hematocrit not requiring any transfusion.    Patient also is on folic acid.  He used to take Folplex, and he occasionally   comes for his vitamin B12 shots, and those levels have been normal in the   clinic.  Remote history of renal stones, which he had passed.    PAST SURGICAL HISTORY:  Includes laser surgery to both the eyes, tonsillectomy,   and several colonoscopies for anemia, and the last colonoscopy was in 2016, by   Dr. Max Branham, and it was unremarkable.    SOCIAL HISTORY:  He is .  He is a resident of [place].  He is   self-employed.  He is originally from [place].  I have been doing this patient   for last 25 years.  He has 5 children.  Occasionally he drinks.  Quit tobacco   many, many years ago as I mentioned, and still has a couple of beers and enjoys   working and very  active.    FAMILY HISTORY:  Pertinent for Mother having asthma and COPD.  She  at the   age of 75.  His father had COPD.  He  at age of 63.  He has 1 brother, and   he is fine; and 5 sisters, all of them are fine.  No history of any kind of a   cancer in the family.    ALLERGIES:  AS MENTIONED, ACE INHIBITORS IN THE FORM OF COUGH.     MEDICATIONS:  On which the patient is on are as follows:    1. Losartan 100 mg daily.    2. Famotidine 20 mg daily.    3. Levothyroxine 75 mcg daily.    4. He is supposed to be on:       a. Folic acid 1 mg daily.       b. Multivitamin 1 tablet daily.       c. Steroid ointment for his hands.       d. Flonase nasal spray.       e. Claritin, which he takes as it is needed.    5. He is also supposed to be on Lipitor for his dyslipidemia 20 mg daily.    REVIEW OF SYSTEMS:  As mentioned in the History of Present Illness.    PHYSICAL EXAM:  GENERAL:  Patient is alert and oriented x3.  He is in no acute   distress now.  He is resting comfortably in the bed.    VITAL SIGNS:  His blood pressure is 104/52, pulse is 101, O2 sat is in 90s.    NECK:  Supple.  No thyromegaly or lymphadenopathy.  There are bilateral carotid   artery bruits.    CVS:  First and second heart sounds are heard normally with soft systolic   ejection murmurs.    ABDOMEN:  Soft, nontender.    EXTREMITIES:  Devoid of any significant edema, cyanosis, or clubbing.    LABS AND INVESTIGATIONS:  As follows:  WBC 15.1, hemoglobin 8.3, hematocrit   26.1, , platelet count 242.  No abnormal cells seen.  Iron profile shows   iron deficiency anemia, iron of 27, TIBC of 199, ferritin 248.53, folate 16.3,   and iron saturation is very low at 14.  Chemistry:  Sodium 143, potassium 5.1,   chloride 114, bicarb 22, BUN 42, creatinine bit elevated , GFR of 45, glucose 135, calcium   8.6, alk phos 94, protein 6.5, albumin 3.0.  AST, ALT normal limits.  Lactate   was 3.2, and later on came down to 1.0.  TSH normal at 0.55.   Troponin negative,   less than 0.010.  The radioimaging shows chest x-ray shows scattered patchy   hazy opacities throughout the lung fields bilaterally, which have slightly   progressed since the prior exam suggesting acute infiltrate component   superimposed on chronic interstitial changes.  The flat and erect of the abdomen   shows findings of constipation, thoracolumbar spondylosis and scoliosis,   osteopenia.  The CAT scan of the abdomen showed thoracolumbar spondylosis with   right compression form at T12 and defect at the inferior endplate of L4,   nondistended gallbladder, gas distended distal esophagus, small hiatal hernia,   extensive hazy reticular and ground-glass interstitial opacities throughout the   lung bases, extensive atherosclerosis, limited evaluation for renal infarct   ischemia without the admission of IV contrast, nonobstructing pinpoint   calcification upper left kidney, findings of constipation with large dense fecal   bolus in the rectum, and gas distended cecum, motion artifacts.    IMPRESSION:    1. Constipation.    2. Dehydration with increase in BUN and creatinine that is prerenal azotemia   along with tachycardia and orthostatic hypotension.    3. Increasing lactic acid on arrival.    4. Possible sepsis, the site being bilateral pneumonia as read on the chest   x-ray, superimposed on chronic changes.    5. Chronic interstitial changes in the lung, which are chronic.    6. Chronic obstructive pulmonary disease versus interstitial lung disease, but   patient does not have any hypoxia.  He is very active.    7. History of primary hypothyroidism.    8. History of dyslipidemia.    9. Remote history of renal stones.    10. History of allergic rhinitis.    11. History of contact dermatitis secondary to his nature of work as a .      12. Peripheral artery disease with right carotid artery more than the left.    13. History of mild anxiety.    14. History of osteopenia.    15. Anemia  worked up in the past, mixed picture, megaloblastic as well as study   showing iron deficiency anemia.    PLAN:    1. To admit the patient.    2. Continue the crystalloids.    3. IV antibiotics to continue.    4. Cultures to be followed.    5. Blood culture the patient if he has any fever.    6. Regular diet.    7. DVT prophylaxis with Lovenox.    8. GI prophylaxis with proton pump inhibitors.    9. Follow with the BUN and creatinine.    10. Continue with the home medications.    11. Hold the losartan.    12. Avoid any nephrotoxic drugs.    13. Injectafer to be injected.    14. If needed, will do a hematology consult because we have done in the past.    15. Patient himself does not want any extensive workup.    16. Carotid ultrasound as well as echocardiogram.    17. Check for nonspecific changes in the morning.    18. Patient used to take baby aspirin that is 81 mg aspirin but had stopped   because of his bruising and nature of work.    19. Continue with folate and vitamin B12.    20. Follow with the labs in the morning.      Pleasure taking care of Mr. Tony Mckeon during his stay at [facility].        ______________________________  MD VANI Suh/SAMMIE  DD:  07/12/2022  Time:  10:58PM  DT:  07/13/2022  Time:  01:07AM  Job #:  [id number]      HISTORY AND PHYSICAL

## 2022-07-14 VITALS
WEIGHT: 135.81 LBS | HEART RATE: 96 BPM | BODY MASS INDEX: 21.31 KG/M2 | DIASTOLIC BLOOD PRESSURE: 68 MMHG | TEMPERATURE: 98 F | OXYGEN SATURATION: 95 % | SYSTOLIC BLOOD PRESSURE: 114 MMHG | HEIGHT: 67 IN | RESPIRATION RATE: 18 BRPM

## 2022-07-14 PROBLEM — H35.30 MACULAR DEGENERATION: Status: RESOLVED | Noted: 2022-07-12 | Resolved: 2022-07-14

## 2022-07-14 PROBLEM — I10 HYPERTENSION: Status: RESOLVED | Noted: 2022-07-12 | Resolved: 2022-07-14

## 2022-07-14 LAB — HEMATOLOGIST REVIEW: NORMAL

## 2022-07-14 PROCEDURE — C9113 INJ PANTOPRAZOLE SODIUM, VIA: HCPCS | Performed by: INTERNAL MEDICINE

## 2022-07-14 PROCEDURE — 63600175 PHARM REV CODE 636 W HCPCS: Performed by: INTERNAL MEDICINE

## 2022-07-14 PROCEDURE — 94761 N-INVAS EAR/PLS OXIMETRY MLT: CPT

## 2022-07-14 PROCEDURE — 25000003 PHARM REV CODE 250: Performed by: INTERNAL MEDICINE

## 2022-07-14 PROCEDURE — G0378 HOSPITAL OBSERVATION PER HR: HCPCS

## 2022-07-14 PROCEDURE — 96366 THER/PROPH/DIAG IV INF ADDON: CPT

## 2022-07-14 PROCEDURE — 96376 TX/PRO/DX INJ SAME DRUG ADON: CPT

## 2022-07-14 RX ORDER — DOCUSATE SODIUM 100 MG/1
100 CAPSULE, LIQUID FILLED ORAL 2 TIMES DAILY
Qty: 60 CAPSULE | Refills: 6 | Status: SHIPPED | OUTPATIENT
Start: 2022-07-14

## 2022-07-14 RX ORDER — LEVOFLOXACIN 500 MG/1
500 TABLET, FILM COATED ORAL DAILY
Qty: 7 TABLET | Refills: 0 | Status: SHIPPED | OUTPATIENT
Start: 2022-07-14 | End: 2022-10-08 | Stop reason: ALTCHOICE

## 2022-07-14 RX ORDER — TAMSULOSIN HYDROCHLORIDE 0.4 MG/1
0.4 CAPSULE ORAL DAILY
Qty: 30 CAPSULE | Refills: 11 | Status: SHIPPED | OUTPATIENT
Start: 2022-07-14 | End: 2023-07-14

## 2022-07-14 RX ADMIN — PIPERACILLIN SODIUM AND TAZOBACTAM SODIUM 4.5 G: 4; .5 INJECTION, POWDER, LYOPHILIZED, FOR SOLUTION INTRAVENOUS at 09:07

## 2022-07-14 RX ADMIN — PANTOPRAZOLE SODIUM 40 MG: 40 INJECTION, POWDER, FOR SOLUTION INTRAVENOUS at 08:07

## 2022-07-14 RX ADMIN — ATORVASTATIN CALCIUM 20 MG: 20 TABLET, FILM COATED ORAL at 08:07

## 2022-07-14 RX ADMIN — FAMOTIDINE 20 MG: 20 TABLET ORAL at 08:07

## 2022-07-14 RX ADMIN — PIPERACILLIN SODIUM AND TAZOBACTAM SODIUM 4.5 G: 4; .5 INJECTION, POWDER, LYOPHILIZED, FOR SOLUTION INTRAVENOUS at 02:07

## 2022-07-14 RX ADMIN — LEVOTHYROXINE SODIUM 75 MCG: 75 TABLET ORAL at 08:07

## 2022-07-14 RX ADMIN — TAMSULOSIN HYDROCHLORIDE 0.4 MG: 0.4 CAPSULE ORAL at 08:07

## 2022-07-14 NOTE — PROGRESS NOTES
[facility]                     [address] [address]    PATIENT NAME:       MICHAEL HINKLE  YOB: 1934  CSN:                [id number]   MRN:                [id number]  ADMIT DATE:         07/12/2022 04:17:00  PHYSICIAN:          James Marsh MD                            PROGRESS NOTE    DATE:      Patient was admitted when he had come in presenting with constipation.  Was   found to be having severe orthostatic hypotension, though he was not   symptomatic.  His blood pressure medications were stopped, and he was encouraged   to drink oral fluids and admitted.  Also, there was a new infiltrate on the   chest x-ray on top of the chronic lung disease findings on his chest x-ray on   the CAT scan in the past and questionable BPH as he was having some difficulty   in urination.  Mason had to be placed this morning.  The cystic changes were   negative.  Patient is in good spirits.  The family want the Mason catheter to be   held.  Dose of tamsulosin has been given, and we shall remove the catheter   after some bladder training.  I had ordered echocardiogram and carotid   ultrasound, which were followed, and I will be letting the results dictated   later.  Patient also had a CT scan of the abdomen, which had shown extensive   hazy reticular and ground-glass interstitial opacities throughout the lung   bases, and I would like to do a CAT scan of the chest tomorrow.    PHYSICAL EXAMINATION:  VITALS:  95/52 is the blood pressure; 80 pulse, regular; afebrile; and his O2   sat is 95% on room air.  HEENT:  Head is normocephalic.  Pupils bilaterally reactive, equal in size.    Mild conjunctival pallor.  No scleral icterus.    NECK:  Trachea is in midline.  CHEST:  Good bilateral air entry with few crepitations bilaterally, especially   in the bases.  CVS:  First and second heart sounds are heard with soft systolic  ejection   murmurs.  ABDOMEN:  Soft, nontender.  EXTREMITIES:  Devoid of any significant edema, cyanosis, or clubbing.    LABS AND INVESTIGATIONS:  Echocardiogram shows intact ejection fraction and some   left atrial enlargement, right ventricular enlargement, ejection fraction 78%,   hyperdynamic systolic function, moderate aortic regurgitation, and moderate   regular tricuspid regurgitation.  The carotid ultrasound showed multiple   atheromatous blocks but less than 70% lesions bilaterally, which is the same as   in the past.  His hemoglobin was 7.5, hematocrit 24.0.  He has been given 2   units of blood, and also, he received iron transfusion early in the morning   before the transfusion.  WBC is 14.3, hemoglobin 7.5, hematocrit 24.0, MCV   101.7, platelet count 229.  Iron 27, TIBC 199, ferritin 348, folate 16.3,   vitamin B12 387, saturation of iron 14%.    IMPRESSION:    1. Hypotension with pneumonic process and increased lactic acid.  Questionable   sepsis versus dehydration.  2. Anemia, mixed.  Increased MCV but iron studies showing the iron-deficiency   anemia.  Patient given 2 units PRBCs.  3. Benign prostatic hypertrophy with lower urinary tract symptoms.  Patient   being placed on Flomax.  4. Bilateral carotid artery disease, less than 70%.    5. Patient has been restarted on anticoagulation.  He had stopped his aspirin   because of easy bruising.  6. History of hypertension, now off the medications, this is the losartan.  7. History of allergic rhinitis.  8. History of chronic allergic dermatitis.   9. Constipation, resolved.    PLAN:  Continue the present line of treatment.  I will get a CAT scan of the   chest tomorrow for the pneumonic process to be more defined as it was partially   visualized on the CT of the abdomen.  Continue the IV antibiotics.  Continue   with IV hydration.  Monitor the orthostatic changes.  Aggressive nutrition.  If   the CAT scan of the chest results come by mid afternoon, I  most likely can   discharge the patient in the evening if he is not dizzy and not weak and there   are no orthostatic changes.      It is a pleasure taking care of Mr. Tony WEEKSAntonio Mike during his stay at   [facility].        ______________________________  MD VANI Suh/SAMMIE  DD:  07/13/2022  Time:  10:46PM  DT:  07/14/2022  Time:  12:56AM  Job #:  [id number]      PROGRESS NOTE

## 2022-07-15 NOTE — DISCHARGE SUMMARY
[facility]                     [address] [address]    PATIENT NAME:       MICHAEL HINKLE  YOB: 1934  CSN:                [id number]   MRN:                [id number]  ADMIT DATE:         07/12/2022 04:17:00  PHYSICIAN:          James Marsh MD                          DISCHARGE SUMMARY    DATE OF DISCHARGE:  07/14/2022 15:00:00    The patient was admitted to the emergency room when he came in for constipation,   was found to be dehydrated with orthostatic changes and infiltrate on his chest   x-ray, which was superimposed on his chronic changes.  The patient was treated   with fluids, IV fluids, boluses, antihypertensive medication.  The losartan was   stopped.  The patient was also given a prescription for Flomax for his BPH   symptoms , and Colace was added for his constipation.  Family was informed   and the patient is being discharged back to the home with the family.    DISCHARGE DIAGNOSES:    1. Constipation, now better with the help of enemas and patient given dietary   education, instructions about fluid intake, as well as a prescription of Colace.     2. Dehydration with prerenal azotemia with persistent orthostatic changes   requiring crystalloids, multiple units, discontinuation of his antihypertensive   medications and a good oral intake to stabilize and negate the orthostatic   changes and now patient with normal blood pressure and instructed on increased   fluid intake, because he still works hard in the sun as a  by hobby, and   also to stop his losartan.   3. Increasing lactic acid level with hypertension.   4. Impending sepsis in presence of the pneumonia process in the lungs,   superimposed on the chronic changes.  Patient treated with IV antibiotics and   being discharged with a prescription of Levaquin.  5. Chronic interstitial change in the lung, unchanged on the CAT scan  done, but   showing acute infiltrate which was treated with Levaquin.   6. History of primary hypothyroidism.   7. History of dyslipidemia.   8. Remote history of renal stones.  9. History of allergic rhinitis.  10. History of contact dermatitis secondary to the nature of his work as a   .    11. Peripheral artery disease with right carotid artery more than left, but no   progression, but bulky atheromatous plaques in both of the carotid arteries.  12. History of mild anxiety.   13. History of osteopenia.   14. Anemia workup in the past, mixed picture, megaloblastic as well as iron   studies showing iron deficiency anemia.  Patient given PRBC transfusions type 2,   as well as iron infusion and patient feeling much better.  This anemia has been   worked up in the past with hematologist, Dr. Diana Ruiz, and is being closely   monitored.    HOSPITAL COURSE:  Mr. Tony Mckeon is a very pleasant 87-year-old gentleman   who is still extremely active, came to the emergency room without informing the   family for constipation, but was found to have severe orthostatic hypotension   along with infiltrate on his chest x-ray as well as increasing BUN and   creatinine, for which he was totally unaware.  He does workout a lot in the sun   because he is a , but his oral intake most likely does not keep pace   with his loss of the body fluids.  As a result, patient was given crystalloids   and he required a bunch of IV fluids in the form of normal saline before   orthostatic changes were negated.  His losartan was stopped, he was educated.    For constipation, we started him on Colace.  He was given enemas with good   results.  The patient's pneumonia was treated with Levaquin and he was educated   again about his diet for his constipation.  During the hospital stay, DVT   prophylaxis was given with Lovenox and GI prophylaxis with proton pump   inhibitors.  Patient also had anemia which became significant with a  further   drop in hemoglobin and hematocrit, requiring PRBC transfusion.  The study showed   the patient has increased MCV and also iron deficiency anemia.  I have worked   up his anemia in the past with Dr. Diana Ruiz, and she thought that he has a   very subtle form of MDS because of his age and did not require any intervention   until the indices were very alarming, and they have not been as they follow him   every 3-4 months in the clinic.  The patient was taking vitamin B12 shots, and   folic acid, but for some for some reason, he stopped coming for the shots and   all these things happen during the COVID debacle.  Later on, the patient was   also given IV antibiotics and for discharge, I have chosen Levaquin to discharge   the patient, and family was made to understand what all we did with him.  He   also had a complaint of LUTS as well as with his BPH and for that, the Flomax   was started.      The new medications are Flomax, Levaquin and Colace.  Colace is over the counter   as his prescription coverage does not cover.      The patient is now back to his normal self and is being discharged home.  The   medications on which the patient is being discharged are as follows.    1. Colace 100 mg 1 capsule twice daily.  2. Levofloxacin 500 mg daily for 7 days.   3. Tamsulosin 0.4 mg in the evening with meals.   4. Lipitor 20 mg daily.  5. Famotidine 20 mg daily.   6. Flonase nasal spray, 1 spray each nostril b.i.d.  7. Hydrocortisone 2% cream, apply to the affected area.  8. Levothyroxine 75 mcg daily.    9. Patient is to stop his losartan altogether.      I will follow the patient in a week in the clinic.    LABS AND INVESTIGATIONS:  Carotid artery shows bilateral carotid artery disease,   bulky atheromatous plaque, but no critical stenosis.  Echocardiogram showed a   good ejection fraction of more than 50%.  Moderate aortic regurgitation, mild   left atrial enlargement, mild right ventricular enlargement,  mild-to-moderate   tricuspid regurgitation, but ejection fraction was 78%.  Left ventricle is   normal with concentric remodeling and hyperdynamic systolic function.  His CT   scan of the abdomen showed atherosclerosis, bilateral posterior pleural   reaction, thoracolumbar spondylosis, history patchy reticular interstitial   opacities at the periphery of the upper lungs and evident throughout the lower   lungs bilaterally which is mildly progressed since the prior exam suggesting the   acute and positive component superimposed on his chronic interstitial changes   that is a pneumonic process.  The chest x-ray of the patient showed scattered   patchy hazy opacities throughout both lungs bilaterally, which is mildly   progressed since the prior examination.  Acute component superimposed on chronic   should be considered strongly.      WBC 14.3, hemoglobin 7.5, hematocrit 24.0, that is when he was given iron   infusion as well as he was given the PRBC transfusion.  , platelet count   is 229.  Iron 27, TIBC 199, ferritin 648, folate 16.3, vitamin B12 level 387,   iron saturation 14.  Folic acid level 16.3.  Lactic acid was 3.2, came down to   1.1.  TSH 0.55, PSA 1.50.  Sodium 142, potassium 4.5, chloride 116, bicarb 21,   BUN 32, creatinine 1.53, GFR of 46, phosphorus 2.5.  AST, ALT within normal   limits.  Troponin less than 0.010.  EKG normal sinus rhythm.      It was indeed a pleasure taking care of Mr. Tony Mckeon during his stay at   [facility].  We will be calling the patient for the appointment and   he will be doing the labs, that is CBC and BMP, before his visit.        ______________________________  James Marsh MD    SS/SAMMIE  DD:  07/15/2022  Time:  02:06AM  DT:  07/15/2022  Time:  03:27AM  Job #:  [id number]      DISCHARGE SUMMARY

## 2022-07-17 LAB
BACTERIA BLD CULT: NORMAL
BACTERIA BLD CULT: NORMAL

## 2022-08-01 ENCOUNTER — LAB REQUISITION (OUTPATIENT)
Dept: LAB | Facility: HOSPITAL | Age: 87
End: 2022-08-01
Payer: MEDICARE

## 2022-08-01 DIAGNOSIS — I10 ESSENTIAL (PRIMARY) HYPERTENSION: ICD-10-CM

## 2022-08-01 DIAGNOSIS — E09.311: ICD-10-CM

## 2022-08-01 DIAGNOSIS — G56.03 CARPAL TUNNEL SYNDROME, BILATERAL UPPER LIMBS: ICD-10-CM

## 2022-08-01 DIAGNOSIS — G47.00 INSOMNIA, UNSPECIFIED: ICD-10-CM

## 2022-08-01 DIAGNOSIS — D52.9 FOLATE DEFICIENCY ANEMIA, UNSPECIFIED: ICD-10-CM

## 2022-08-01 DIAGNOSIS — D64.9 ANEMIA, UNSPECIFIED: ICD-10-CM

## 2022-08-01 DIAGNOSIS — J47.9 BRONCHIECTASIS, UNCOMPLICATED: ICD-10-CM

## 2022-08-01 DIAGNOSIS — R53.1 WEAKNESS: ICD-10-CM

## 2022-08-01 DIAGNOSIS — E78.00 PURE HYPERCHOLESTEROLEMIA, UNSPECIFIED: ICD-10-CM

## 2022-08-01 LAB
ALBUMIN SERPL-MCNC: 2.9 GM/DL (ref 3.4–4.8)
ALBUMIN/GLOB SERPL: 0.9 RATIO (ref 1.1–2)
ALP SERPL-CCNC: 103 UNIT/L (ref 40–150)
ALT SERPL-CCNC: 28 UNIT/L (ref 0–55)
AST SERPL-CCNC: 22 UNIT/L (ref 5–34)
BASOPHILS # BLD AUTO: 0.07 X10(3)/MCL (ref 0–0.2)
BASOPHILS NFR BLD AUTO: 0.9 %
BILIRUBIN DIRECT+TOT PNL SERPL-MCNC: 0.4 MG/DL
BUN SERPL-MCNC: 31 MG/DL (ref 8.4–25.7)
CALCIUM SERPL-MCNC: 8.9 MG/DL (ref 8.8–10)
CHLORIDE SERPL-SCNC: 104 MMOL/L (ref 98–107)
CHOLEST SERPL-MCNC: 133 MG/DL
CHOLEST/HDLC SERPL: 3 {RATIO} (ref 0–5)
CO2 SERPL-SCNC: 25 MMOL/L (ref 23–31)
CREAT SERPL-MCNC: 1.24 MG/DL (ref 0.73–1.18)
EOSINOPHIL # BLD AUTO: 0.23 X10(3)/MCL (ref 0–0.9)
EOSINOPHIL NFR BLD AUTO: 3 %
ERYTHROCYTE [DISTWIDTH] IN BLOOD BY AUTOMATED COUNT: 15.8 % (ref 11.5–17)
FERRITIN SERPL-MCNC: 695.44 NG/ML (ref 21.81–274.66)
FOLATE SERPL-MCNC: 35 NG/ML (ref 7–31.4)
GLOBULIN SER-MCNC: 3.2 GM/DL (ref 2.4–3.5)
GLUCOSE SERPL-MCNC: 98 MG/DL (ref 82–115)
HCT VFR BLD AUTO: 32.6 % (ref 42–52)
HDLC SERPL-MCNC: 42 MG/DL (ref 35–60)
HGB BLD-MCNC: 10.7 GM/DL (ref 14–18)
IMM GRANULOCYTES # BLD AUTO: 0.03 X10(3)/MCL (ref 0–0.04)
IMM GRANULOCYTES NFR BLD AUTO: 0.4 %
IRON SATN MFR SERPL: 66 % (ref 20–50)
IRON SERPL-MCNC: 147 UG/DL (ref 65–175)
LDLC SERPL CALC-MCNC: 74 MG/DL (ref 50–140)
LYMPHOCYTES # BLD AUTO: 2.86 X10(3)/MCL (ref 0.6–4.6)
LYMPHOCYTES NFR BLD AUTO: 37.3 %
MCH RBC QN AUTO: 30.7 PG (ref 27–31)
MCHC RBC AUTO-ENTMCNC: 32.8 MG/DL (ref 33–36)
MCV RBC AUTO: 93.7 FL (ref 80–94)
MONOCYTES # BLD AUTO: 1.17 X10(3)/MCL (ref 0.1–1.3)
MONOCYTES NFR BLD AUTO: 15.3 %
NEUTROPHILS # BLD AUTO: 3.3 X10(3)/MCL (ref 2.1–9.2)
NEUTROPHILS NFR BLD AUTO: 43.1 %
PLATELET # BLD AUTO: 233 X10(3)/MCL (ref 130–400)
PMV BLD AUTO: 10.9 FL (ref 7.4–10.4)
POTASSIUM SERPL-SCNC: 5.1 MMOL/L (ref 3.5–5.1)
PROT SERPL-MCNC: 6.1 GM/DL (ref 5.8–7.6)
RBC # BLD AUTO: 3.48 X10(6)/MCL (ref 4.7–6.1)
SODIUM SERPL-SCNC: 137 MMOL/L (ref 136–145)
TIBC SERPL-MCNC: 224 UG/DL (ref 250–450)
TIBC SERPL-MCNC: 77 UG/DL (ref 69–240)
TRIGL SERPL-MCNC: 83 MG/DL (ref 34–140)
TSH SERPL-ACNC: 0.84 UIU/ML (ref 0.35–4.94)
VLDLC SERPL CALC-MCNC: 17 MG/DL
WBC # SPEC AUTO: 7.7 X10(3)/MCL (ref 4.5–11.5)

## 2022-08-01 PROCEDURE — 83540 ASSAY OF IRON: CPT | Performed by: INTERNAL MEDICINE

## 2022-08-01 PROCEDURE — 82746 ASSAY OF FOLIC ACID SERUM: CPT | Performed by: INTERNAL MEDICINE

## 2022-08-01 PROCEDURE — 80053 COMPREHEN METABOLIC PANEL: CPT | Performed by: INTERNAL MEDICINE

## 2022-08-01 PROCEDURE — 82728 ASSAY OF FERRITIN: CPT | Performed by: INTERNAL MEDICINE

## 2022-08-01 PROCEDURE — 84443 ASSAY THYROID STIM HORMONE: CPT | Performed by: INTERNAL MEDICINE

## 2022-08-01 PROCEDURE — 85025 COMPLETE CBC W/AUTO DIFF WBC: CPT | Performed by: INTERNAL MEDICINE

## 2022-08-01 PROCEDURE — 80061 LIPID PANEL: CPT | Performed by: INTERNAL MEDICINE

## 2022-10-08 ENCOUNTER — HOSPITAL ENCOUNTER (INPATIENT)
Facility: HOSPITAL | Age: 87
LOS: 1 days | Discharge: HOME | DRG: 185 | End: 2022-10-09
Attending: STUDENT IN AN ORGANIZED HEALTH CARE EDUCATION/TRAINING PROGRAM | Admitting: SURGERY
Payer: MEDICARE

## 2022-10-08 ENCOUNTER — HOSPITAL ENCOUNTER (EMERGENCY)
Facility: HOSPITAL | Age: 87
Discharge: STILL ADMITTED OR ADMINISTRATIVE | End: 2022-10-08
Attending: INTERNAL MEDICINE
Payer: MEDICARE

## 2022-10-08 VITALS
TEMPERATURE: 98 F | OXYGEN SATURATION: 98 % | BODY MASS INDEX: 21.24 KG/M2 | HEART RATE: 64 BPM | WEIGHT: 143.38 LBS | RESPIRATION RATE: 18 BRPM | SYSTOLIC BLOOD PRESSURE: 114 MMHG | HEIGHT: 69 IN | DIASTOLIC BLOOD PRESSURE: 76 MMHG

## 2022-10-08 DIAGNOSIS — S22.42XA CLOSED FRACTURE OF MULTIPLE RIBS OF LEFT SIDE, INITIAL ENCOUNTER: ICD-10-CM

## 2022-10-08 DIAGNOSIS — R52 PAIN: ICD-10-CM
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
## 2022-10-08 DIAGNOSIS — S22.49XA MULTIPLE RIB FRACTURES: Primary | ICD-10-CM

## 2022-10-08 DIAGNOSIS — R42 DIZZINESS: ICD-10-CM
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
## 2022-10-08 DIAGNOSIS — S22.42XA CLOSED FRACTURE OF MULTIPLE RIBS OF LEFT SIDE, INITIAL ENCOUNTER: Primary | ICD-10-CM

## 2022-10-08 DIAGNOSIS — E86.0 DEHYDRATION: ICD-10-CM

## 2022-10-08 DIAGNOSIS — A41.9 SEPSIS, DUE TO UNSPECIFIED ORGANISM, UNSPECIFIED WHETHER ACUTE ORGAN DYSFUNCTION PRESENT: ICD-10-CM

## 2022-10-08 DIAGNOSIS — K59.00 CONSTIPATION: ICD-10-CM

## 2022-10-08 LAB
ABS NEUT CALC (OHS): 6.58 X10(3)/MCL (ref 2.1–9.2)
ALBUMIN SERPL-MCNC: 3.3 GM/DL (ref 3.4–4.8)
ALBUMIN/GLOB SERPL: 0.9 RATIO (ref 1.1–2)
ALP SERPL-CCNC: 82 UNIT/L (ref 40–150)
ALT SERPL-CCNC: 24 UNIT/L (ref 0–55)
APPEARANCE UR: CLEAR
APTT PPP: 22.7 SECONDS (ref 23.2–33.7)
AST SERPL-CCNC: 16 UNIT/L (ref 5–34)
BILIRUB UR QL STRIP.AUTO: NEGATIVE MG/DL
BILIRUBIN DIRECT+TOT PNL SERPL-MCNC: 0.7 MG/DL
BUN SERPL-MCNC: 25 MG/DL (ref 8.4–25.7)
CALCIUM SERPL-MCNC: 9 MG/DL (ref 8.8–10)
CHLORIDE SERPL-SCNC: 107 MMOL/L (ref 98–107)
CO2 SERPL-SCNC: 23 MMOL/L (ref 23–31)
COLOR UR AUTO: YELLOW
CREAT SERPL-MCNC: 1.21 MG/DL (ref 0.73–1.18)
ERYTHROCYTE [DISTWIDTH] IN BLOOD BY AUTOMATED COUNT: 18.8 % (ref 11.5–17)
GFR SERPLBLD CREATININE-BSD FMLA CKD-EPI: 58 MLS/MIN/1.73/M2
GLOBULIN SER-MCNC: 3.8 GM/DL (ref 2.4–3.5)
GLUCOSE SERPL-MCNC: 101 MG/DL (ref 82–115)
GLUCOSE UR QL STRIP.AUTO: NEGATIVE MG/DL
HCT VFR BLD AUTO: 31.2 % (ref 42–52)
HGB BLD-MCNC: 10 GM/DL (ref 14–18)
IMM GRANULOCYTES # BLD AUTO: 0.1 X10(3)/MCL (ref 0–0.04)
IMM GRANULOCYTES NFR BLD AUTO: 0.7 %
INR BLD: 0.98 (ref 0–1.3)
KETONES UR QL STRIP.AUTO: NEGATIVE MG/DL
LEUKOCYTE ESTERASE UR QL STRIP.AUTO: NEGATIVE UNIT/L
LYMPHOCYTES NFR BLD MANUAL: 52 % (ref 13–40)
LYMPHOCYTES NFR BLD MANUAL: 7.96 X10(3)/MCL
MCH RBC QN AUTO: 31.1 PG (ref 27–31)
MCHC RBC AUTO-ENTMCNC: 32.1 MG/DL (ref 33–36)
MCV RBC AUTO: 96.9 FL (ref 80–94)
MONOCYTES NFR BLD MANUAL: 0.77 X10(3)/MCL (ref 0.1–1.3)
MONOCYTES NFR BLD MANUAL: 5 % (ref 2–11)
NEUTROPHILS NFR BLD MANUAL: 43 % (ref 47–80)
NITRITE UR QL STRIP.AUTO: NEGATIVE
PH UR STRIP.AUTO: 6 [PH]
PLATELET # BLD AUTO: 270 X10(3)/MCL (ref 130–400)
PLATELET # BLD EST: ADEQUATE 10*3/UL
PMV BLD AUTO: 10.6 FL (ref 7.4–10.4)
POTASSIUM SERPL-SCNC: 4.4 MMOL/L (ref 3.5–5.1)
PROT SERPL-MCNC: 7.1 GM/DL (ref 5.8–7.6)
PROT UR QL STRIP.AUTO: NEGATIVE MG/DL
PROTHROMBIN TIME: 12.9 SECONDS (ref 12.5–14.5)
RBC # BLD AUTO: 3.22 X10(6)/MCL (ref 4.7–6.1)
RBC UR QL AUTO: NEGATIVE UNIT/L
SARS-COV-2 RDRP RESP QL NAA+PROBE: NEGATIVE
SODIUM SERPL-SCNC: 138 MMOL/L (ref 136–145)
SP GR UR STRIP.AUTO: <=1.005
TROPONIN I SERPL-MCNC: 0.01 NG/ML (ref 0–0.04)
UROBILINOGEN UR STRIP-ACNC: 0.2 MG/DL
WBC # SPEC AUTO: 15.3 X10(3)/MCL (ref 4.5–11.5)

## 2022-10-08 PROCEDURE — 85027 COMPLETE CBC AUTOMATED: CPT | Performed by: NURSE PRACTITIONER

## 2022-10-08 PROCEDURE — 93010 EKG 12-LEAD: ICD-10-PCS | Mod: ,,, | Performed by: INTERNAL MEDICINE

## 2022-10-08 PROCEDURE — 93005 ELECTROCARDIOGRAM TRACING: CPT

## 2022-10-08 PROCEDURE — 63600175 PHARM REV CODE 636 W HCPCS: Performed by: NURSE PRACTITIONER

## 2022-10-08 PROCEDURE — 63600175 PHARM REV CODE 636 W HCPCS: Performed by: STUDENT IN AN ORGANIZED HEALTH CARE EDUCATION/TRAINING PROGRAM

## 2022-10-08 PROCEDURE — 84484 ASSAY OF TROPONIN QUANT: CPT | Performed by: NURSE PRACTITIONER

## 2022-10-08 PROCEDURE — 11000001 HC ACUTE MED/SURG PRIVATE ROOM

## 2022-10-08 PROCEDURE — 85025 COMPLETE CBC W/AUTO DIFF WBC: CPT | Performed by: NURSE PRACTITIONER

## 2022-10-08 PROCEDURE — 96374 THER/PROPH/DIAG INJ IV PUSH: CPT

## 2022-10-08 PROCEDURE — 81003 URINALYSIS AUTO W/O SCOPE: CPT | Performed by: NURSE PRACTITIONER

## 2022-10-08 PROCEDURE — 97162 PT EVAL MOD COMPLEX 30 MIN: CPT

## 2022-10-08 PROCEDURE — 93010 ELECTROCARDIOGRAM REPORT: CPT | Mod: ,,, | Performed by: INTERNAL MEDICINE

## 2022-10-08 PROCEDURE — 85610 PROTHROMBIN TIME: CPT | Performed by: NURSE PRACTITIONER

## 2022-10-08 PROCEDURE — 25000003 PHARM REV CODE 250: Performed by: STUDENT IN AN ORGANIZED HEALTH CARE EDUCATION/TRAINING PROGRAM

## 2022-10-08 PROCEDURE — 99285 EMERGENCY DEPT VISIT HI MDM: CPT | Mod: 25,27

## 2022-10-08 PROCEDURE — 96361 HYDRATE IV INFUSION ADD-ON: CPT

## 2022-10-08 PROCEDURE — 36415 COLL VENOUS BLD VENIPUNCTURE: CPT | Performed by: NURSE PRACTITIONER

## 2022-10-08 PROCEDURE — 85730 THROMBOPLASTIN TIME PARTIAL: CPT | Performed by: NURSE PRACTITIONER

## 2022-10-08 PROCEDURE — 87635 SARS-COV-2 COVID-19 AMP PRB: CPT | Performed by: NURSE PRACTITIONER

## 2022-10-08 PROCEDURE — 80053 COMPREHEN METABOLIC PANEL: CPT | Performed by: NURSE PRACTITIONER

## 2022-10-08 PROCEDURE — 99285 EMERGENCY DEPT VISIT HI MDM: CPT | Mod: 25

## 2022-10-08 PROCEDURE — 96375 TX/PRO/DX INJ NEW DRUG ADDON: CPT

## 2022-10-08 RX ORDER — ONDANSETRON 2 MG/ML
4 INJECTION INTRAMUSCULAR; INTRAVENOUS EVERY 12 HOURS PRN
Status: DISCONTINUED | OUTPATIENT
Start: 2022-10-08 | End: 2022-10-09 | Stop reason: HOSPADM

## 2022-10-08 RX ORDER — SODIUM CHLORIDE, SODIUM LACTATE, POTASSIUM CHLORIDE, CALCIUM CHLORIDE 600; 310; 30; 20 MG/100ML; MG/100ML; MG/100ML; MG/100ML
INJECTION, SOLUTION INTRAVENOUS CONTINUOUS
Status: DISCONTINUED | OUTPATIENT
Start: 2022-10-08 | End: 2022-10-09

## 2022-10-08 RX ORDER — ACETAMINOPHEN 325 MG/1
650 TABLET ORAL EVERY 8 HOURS PRN
Status: DISCONTINUED | OUTPATIENT
Start: 2022-10-08 | End: 2022-10-09 | Stop reason: HOSPADM

## 2022-10-08 RX ORDER — MONTELUKAST SODIUM 10 MG/1
10 TABLET ORAL DAILY
COMMUNITY
Start: 2022-08-29

## 2022-10-08 RX ORDER — OXYCODONE HYDROCHLORIDE 5 MG/1
10 TABLET ORAL EVERY 4 HOURS PRN
Status: DISCONTINUED | OUTPATIENT
Start: 2022-10-08 | End: 2022-10-09 | Stop reason: HOSPADM

## 2022-10-08 RX ORDER — PREDNISONE 10 MG/1
10 TABLET ORAL DAILY
COMMUNITY
Start: 2022-10-03

## 2022-10-08 RX ORDER — HYDROXYZINE HYDROCHLORIDE 25 MG/1
25 TABLET, FILM COATED ORAL DAILY
COMMUNITY
Start: 2022-10-03

## 2022-10-08 RX ORDER — ACETAMINOPHEN 325 MG/1
650 TABLET ORAL EVERY 6 HOURS
Status: DISCONTINUED | OUTPATIENT
Start: 2022-10-08 | End: 2022-10-09 | Stop reason: HOSPADM

## 2022-10-08 RX ORDER — TRIAMCINOLONE ACETONIDE 1 MG/G
1 CREAM TOPICAL 2 TIMES DAILY PRN
COMMUNITY
Start: 2022-10-03

## 2022-10-08 RX ORDER — LIDOCAINE HYDROCHLORIDE 10 MG/ML
1 INJECTION INFILTRATION; PERINEURAL ONCE
Status: DISCONTINUED | OUTPATIENT
Start: 2022-10-08 | End: 2022-10-09 | Stop reason: HOSPADM

## 2022-10-08 RX ORDER — OXYCODONE HYDROCHLORIDE 5 MG/1
5 TABLET ORAL EVERY 4 HOURS PRN
Status: DISCONTINUED | OUTPATIENT
Start: 2022-10-08 | End: 2022-10-09 | Stop reason: HOSPADM

## 2022-10-08 RX ORDER — FOLIC ACID 1 MG/1
1000 TABLET ORAL DAILY
COMMUNITY
Start: 2022-08-22

## 2022-10-08 RX ORDER — MORPHINE SULFATE 4 MG/ML
2 INJECTION, SOLUTION INTRAMUSCULAR; INTRAVENOUS
Status: COMPLETED | OUTPATIENT
Start: 2022-10-08 | End: 2022-10-08

## 2022-10-08 RX ORDER — ONDANSETRON 2 MG/ML
2 INJECTION INTRAMUSCULAR; INTRAVENOUS
Status: COMPLETED | OUTPATIENT
Start: 2022-10-08 | End: 2022-10-08

## 2022-10-08 RX ORDER — ATORVASTATIN CALCIUM 10 MG/1
20 TABLET, FILM COATED ORAL DAILY
Status: DISCONTINUED | OUTPATIENT
Start: 2022-10-09 | End: 2022-10-09 | Stop reason: HOSPADM

## 2022-10-08 RX ORDER — FAMOTIDINE 20 MG/1
20 TABLET, FILM COATED ORAL DAILY
Status: DISCONTINUED | OUTPATIENT
Start: 2022-10-09 | End: 2022-10-09 | Stop reason: HOSPADM

## 2022-10-08 RX ORDER — MONTELUKAST SODIUM 10 MG/1
10 TABLET ORAL DAILY
Status: DISCONTINUED | OUTPATIENT
Start: 2022-10-09 | End: 2022-10-09 | Stop reason: HOSPADM

## 2022-10-08 RX ORDER — TAMSULOSIN HYDROCHLORIDE 0.4 MG/1
0.4 CAPSULE ORAL DAILY
Status: DISCONTINUED | OUTPATIENT
Start: 2022-10-09 | End: 2022-10-09 | Stop reason: HOSPADM

## 2022-10-08 RX ORDER — TALC
6 POWDER (GRAM) TOPICAL NIGHTLY PRN
Status: DISCONTINUED | OUTPATIENT
Start: 2022-10-08 | End: 2022-10-09 | Stop reason: HOSPADM

## 2022-10-08 RX ORDER — HYDROXYZINE HYDROCHLORIDE 25 MG/1
25 TABLET, FILM COATED ORAL DAILY
Status: DISCONTINUED | OUTPATIENT
Start: 2022-10-09 | End: 2022-10-08

## 2022-10-08 RX ADMIN — MORPHINE SULFATE 2 MG: 4 INJECTION, SOLUTION INTRAMUSCULAR; INTRAVENOUS at 11:10

## 2022-10-08 RX ADMIN — SODIUM CHLORIDE, POTASSIUM CHLORIDE, SODIUM LACTATE AND CALCIUM CHLORIDE: 600; 310; 30; 20 INJECTION, SOLUTION INTRAVENOUS at 04:10

## 2022-10-08 RX ADMIN — OXYCODONE 10 MG: 5 TABLET ORAL at 10:10

## 2022-10-08 RX ADMIN — SODIUM CHLORIDE, POTASSIUM CHLORIDE, SODIUM LACTATE AND CALCIUM CHLORIDE 500 ML: 600; 310; 30; 20 INJECTION, SOLUTION INTRAVENOUS at 11:10

## 2022-10-08 RX ADMIN — SODIUM CHLORIDE, POTASSIUM CHLORIDE, SODIUM LACTATE AND CALCIUM CHLORIDE: 600; 310; 30; 20 INJECTION, SOLUTION INTRAVENOUS at 10:10

## 2022-10-08 RX ADMIN — OXYCODONE 5 MG: 5 TABLET ORAL at 05:10

## 2022-10-08 RX ADMIN — ONDANSETRON 2 MG: 2 INJECTION INTRAMUSCULAR; INTRAVENOUS at 11:10

## 2022-10-08 NOTE — Clinical Note
Diagnosis: Multiple rib fractures [118527]   Admitting Provider:: ROBERTA JOLLEY JR [[id number]]   Future Attending Provider: ROBERTA JOLLEY JR [[id number]]   Reason for IP Medical Treatment  (Clinical interventions that can only be accomplished in the IP setting? ) :: Pain control   Estimated Length of Stay:: 2 midnights   I certify that Inpatient services for greater than or equal to 2 midnights are medically necessary:: Yes   Plans for Post-Acute care--if anticipated (pick the single best option):: A. No post acute care anticipated at this time

## 2022-10-08 NOTE — H&P
[facility]  H&P / Consult Note  Trauma and Acute Care Surgery    Patient Name: Tony Mckeon  YOB: 1934  Date of Admission: 10/8/2022  Date: 10/08/2022 3:39 PM  Reason for Consult: multiple rib fractures    HPI:   89yo M with hx carotid stenois,s HTN, HLD, eczema, hypothyroidism, anemia, macular degeneration who presents to the ED as a transfer from OSH with L sided rib pain following a fall 4 days ago. He reports waking up early in the morning and, upon standing, he fell backwards and landed on a fan on the floor hitting his left chest in that process. Denies LOC or head trauma. Does not take blood thinners. Following the event he was able to quickly get up and did not have any additional episodes of dizziness. He has had L sided chest/rib pain since that time and due to the longevity of this pain he decided to present to an external facility today for evaluation.    Review of Systems: 12pt ROS negative except otherwise stated in HPI     PMHx:  Past Medical History:   Diagnosis Date    Carotid stenosis     Hypercholesterolemia     Hypertension     Hypothyroidism     Macular degeneration      PSHx: none  Family Hx: noncontributory     Social History     Socioeconomic History    Marital status:    Tobacco Use    Smoking status: Never    Smokeless tobacco: Never   Substance and Sexual Activity    Alcohol use: Not Currently    Drug use: Never     Social History     Tobacco Use   Smoking Status Never   Smokeless Tobacco Never      Current Facility-Administered Medications on File Prior to Encounter   Medication    [COMPLETED] lactated ringers bolus 500 mL    [COMPLETED] morphine injection 2 mg    [COMPLETED] ondansetron injection 2 mg     Current Outpatient Medications on File Prior to Encounter   Medication Sig    atorvastatin (LIPITOR) 20 MG tablet Take 20 mg by mouth once daily.    docusate sodium (COLACE) 100 MG capsule Take 1 capsule (100 mg total) by mouth 2 (two)  times daily.    famotidine (PEPCID) 20 MG tablet Take 20 mg by mouth once daily.    fluticasone propionate (FLONASE) 50 mcg/actuation nasal spray by Each Nostril route.    folic acid (FOLVITE) 1 MG tablet Take 1,000 mcg by mouth once daily.    hydrocortisone 2.5 % cream Apply topically.    hydrOXYzine HCL (ATARAX) 25 MG tablet Take 25 mg by mouth once daily.    levothyroxine (SYNTHROID) 75 MCG tablet Take 75 mcg by mouth once daily.    montelukast (SINGULAIR) 10 mg tablet Take 10 mg by mouth once daily.    predniSONE (DELTASONE) 10 MG tablet Take 10 mg by mouth once daily.    tamsulosin (FLOMAX) 0.4 mg Cap Take 1 capsule (0.4 mg total) by mouth once daily.    triamcinolone acetonide 0.1% (KENALOG) 0.1 % cream Apply 1 g topically 2 (two) times daily as needed.    [DISCONTINUED] levoFLOXacin (LEVAQUIN) 500 MG tablet Take 1 tablet (500 mg total) by mouth once daily.    [DISCONTINUED] losartan (COZAAR) 100 MG tablet Take 100 mg by mouth once daily.     Continuous Infusions:    lactated ringers       PRN Meds:acetaminophen, melatonin, ondansetron, oxyCODONE, oxyCODONE    Allergies: Review of patient's allergies indicates:  No Known Allergies    OBJECTIVE:  Temp:  [97.5 °F (36.4 °C)-98.4 °F (36.9 °C)] 97.5 °F (36.4 °C)  Pulse:  [64-72] 67  Resp:  [16-20] 18  SpO2:  [97 %-98 %] 97 %  BP: (114-143)/(63-76) 136/63  Body mass index is 19.94 kg/m².     Physical Exam:  GEN: NAD, awake/alert, interactive  HEENT: NCAT, EOMI, MMM   CV: regular rate  PULM: normal WOB on RA  CHEST: TTP at left posterior chest wall   ABD: soft, NT, ND  MSK/EXT: moves all ext spontaneously  SKIN: BL forearm eczema with scarring    Laboratory:  Recent Labs     10/08/22  1119 10/08/22  1215   WBC 15.3*  --    HGB 10.0*  --    HCT 31.2*  --      --    PTT  --  22.7*   INR  --  0.98     Recent Labs     10/08/22  1119      K 4.4   CO2 23   BUN 25.0   CREATININE 1.21*   CALCIUM 9.0   ALBUMIN 3.3*   BILITOT 0.7   AST 16   ALKPHOS 82   ALT 24      Troponin:  Recent Labs     10/08/22  1119   TROPONINI 0.012     CBC:  Recent Labs     10/08/22  1119   WBC 15.3*   RBC 3.22*   HGB 10.0*   HCT 31.2*      MCV 96.9*   MCH 31.1*   MCHC 32.1*     CMP:  Recent Labs     10/08/22  1119   CALCIUM 9.0   ALBUMIN 3.3*      K 4.4   CO2 23   BUN 25.0   CREATININE 1.21*   ALKPHOS 82   ALT 24   AST 16   BILITOT 0.7     Imaging:  CT C/A/P: posterior L 9-11th rib fractures, mildly displaced     ASSESSMENT / PLAN:  87yo M presents with L sided 9-11th rib fractures.   Admit to trauma surgery  MM pain control regimen  Incentive spirometry, deep breathing, pulm toilet  Scheduled saline nebs  PT/OT consults   OK to use baby oil/cream as needed for BL forearm eczema  DVT PPx: Yoli Mcneil MD  [employer] General Surgery PGY-4  10/08/2022, 3:39 PM

## 2022-10-08 NOTE — ED PROVIDER NOTES
Encounter Date: 10/8/2022       History     Chief Complaint   Patient presents with    Fall     Fall on Tuesday. C/o left rib pain and hurts to breath. Also reports no BM since tuesday     The patient is an 88 year old male with significant history of carotid stenosis, hyperlipidemia, hypertension, anemia, hypothyroidism, eczema, and macular degeneration who presents to the ED today with complaints of severe left posterior rib pain after becoming dizzy in the middle of the night and falling onto a box fan causing left rib pain, difficulty breathing, difficulty having a bowel movement due to severe pain, the patient states he can feel a click in his back, he attributes his dizziness to vistaril started by his dermatologist for eczema, states sob due to pain, denies any chest pain, denies any nausea, denies any vomiting, denies any other episodes of dizziness, states he has not had a bowel movement since last Tuesday.  He was seen in this ED last July and was found to be hypotensive and dizzy, hemoccult positive at that time, H and H 7.5 and 24, transfused 2 units at that time.  States has been feeling well since until this recent fall.    Review of patient's allergies indicates:  No Known Allergies  Past Medical History:   Diagnosis Date    Carotid stenosis     Hypercholesterolemia     Hypertension     Hypothyroidism     Macular degeneration      History reviewed. No pertinent surgical history.  History reviewed. No pertinent family history.  Social History     Tobacco Use    Smoking status: Never    Smokeless tobacco: Never   Substance Use Topics    Alcohol use: Not Currently    Drug use: Never     Review of Systems   Constitutional:  Positive for activity change.   Respiratory:  Positive for shortness of breath.    Cardiovascular:  Negative for chest pain.   Gastrointestinal:  Positive for constipation.   Neurological:  Positive for dizziness.   All other systems reviewed and are negative.    Physical Exam      Initial Vitals [10/08/22 1037]   BP Pulse Resp Temp SpO2   (!) 143/74 72 18 98.4 °F (36.9 °C) 98 %      MAP       --         Physical Exam    Nursing note and vitals reviewed.  Constitutional: He appears well-developed and well-nourished.   HENT:   Head: Normocephalic and atraumatic.   Nose: Nose normal.   Eyes: Conjunctivae and EOM are normal.   Neck: Neck supple.   Normal range of motion.  Cardiovascular:  Normal rate, regular rhythm and normal heart sounds.           Pulmonary/Chest: Breath sounds normal. No respiratory distress.   Cta, diminished in the bases, severe pain to palpation left medial/lateral posterior 11th rib region, visible deformity, audible click upon inspiration with crepitus,    Abdominal: Abdomen is soft. Bowel sounds are normal.   Musculoskeletal:      Cervical back: Normal, normal range of motion and neck supple.      Thoracic back: Normal.         ED Course   Procedures    Orders Placed This Encounter    X-Ray Ribs 2 View Left    X-Ray Chest 1 View    X-Ray Abdomen AP 1 View    CT Chest Without Contrast    CT Head Without Contrast    CBC auto differential    Comprehensive metabolic panel    Urinalysis, Reflex to Urine Culture Urine, Clean Catch    CBC with Differential    Manual Differential    APTT    Protime-INR    Troponin I    COVID-19 Rapid Screening    EKG 12-lead    PFC Facilitated Request    morphine injection 2 mg    lactated ringers bolus 500 mL    ondansetron injection 2 mg       Admission on 10/08/2022   Component Date Value Ref Range Status    Sodium Level 10/08/2022 138  136 - 145 mmol/L Final    Potassium Level 10/08/2022 4.4  3.5 - 5.1 mmol/L Final    Chloride 10/08/2022 107  98 - 107 mmol/L Final    Carbon Dioxide 10/08/2022 23  23 - 31 mmol/L Final    Glucose Level 10/08/2022 101  82 - 115 mg/dL Final    Blood Urea Nitrogen 10/08/2022 25.0  8.4 - 25.7 mg/dL Final    Creatinine 10/08/2022 1.21 (H)  0.73 - 1.18 mg/dL Final    Calcium Level Total 10/08/2022 9.0  8.8 -  10.0 mg/dL Final    Protein Total 10/08/2022 7.1  5.8 - 7.6 gm/dL Final    Albumin Level 10/08/2022 3.3 (L)  3.4 - 4.8 gm/dL Final    Globulin 10/08/2022 3.8 (H)  2.4 - 3.5 gm/dL Final    Albumin/Globulin Ratio 10/08/2022 0.9 (L)  1.1 - 2.0 ratio Final    Bilirubin Total 10/08/2022 0.7  <=1.5 mg/dL Final    Alkaline Phosphatase 10/08/2022 82  40 - 150 unit/L Final    Alanine Aminotransferase 10/08/2022 24  0 - 55 unit/L Final    Aspartate Aminotransferase 10/08/2022 16  5 - 34 unit/L Final    eGFR 10/08/2022 58  mls/min/1.73/m2 Final    Color, UA 10/08/2022 Yellow  Yellow, Colorless, Other, Clear Final    Appearance, UA 10/08/2022 Clear  Clear Final    Specific Gravity, UA 10/08/2022 <=1.005   Final    pH, UA 10/08/2022 6.0  5.0, 5.5, 6.0, 6.5, 7.0, 7.5, 8.0, 8.5 Final    Protein, UA 10/08/2022 Negative  Negative mg/dL Final    Glucose, UA 10/08/2022 Negative  Negative, Normal mg/dL Final    Ketones, UA 10/08/2022 Negative  Negative mg/dL Final    Blood, UA 10/08/2022 Negative  Negative unit/L Final    Bilirubin, UA 10/08/2022 Negative  Negative mg/dL Final    Urobilinogen, UA 10/08/2022 0.2  0.2, 1.0, Normal mg/dL Final    Nitrites, UA 10/08/2022 Negative  Negative Final    Leukocyte Esterase, UA 10/08/2022 Negative  Negative unit/L Final    WBC 10/08/2022 15.3 (H)  4.5 - 11.5 x10(3)/mcL Final    RBC 10/08/2022 3.22 (L)  4.70 - 6.10 x10(6)/mcL Final    Hgb 10/08/2022 10.0 (L)  14.0 - 18.0 gm/dL Final    Hct 10/08/2022 31.2 (L)  42.0 - 52.0 % Final    MCV 10/08/2022 96.9 (H)  80.0 - 94.0 fL Final    MCH 10/08/2022 31.1 (H)  27.0 - 31.0 pg Final    MCHC 10/08/2022 32.1 (L)  33.0 - 36.0 mg/dL Final    RDW 10/08/2022 18.8 (H)  11.5 - 17.0 % Final    Platelet 10/08/2022 270  130 - 400 x10(3)/mcL Final    MPV 10/08/2022 10.6 (H)  7.4 - 10.4 fL Final    IG# 10/08/2022 0.10 (H)  0 - 0.04 x10(3)/mcL Final    IG% 10/08/2022 0.7  % Final    Neut Man 10/08/2022 43 (L)  47 - 80 % Final    Lymph Man 10/08/2022 52 (H)  13 - 40 %  Final    Monocyte Man 10/08/2022 5  2 - 11 % Final    Abs Neut calc 10/08/2022 6.579  2.1 - 9.2 x10(3)/mcL Final    Abs Mono 10/08/2022 0.765  0.1 - 1.3 x10(3)/mcL Final    Abs Lymp 10/08/2022 7.956 (H)  0.6 - 4.6 x10(3)/mcL Final    Platelet Est 10/08/2022 Adequate  Normal, Adequate Final    PTT 10/08/2022 22.7 (L)  23.2 - 33.7 seconds Final    PT 10/08/2022 12.9  12.5 - 14.5 seconds Final    INR 10/08/2022 0.98  0.00 - 1.30 Final    Troponin-I 10/08/2022 0.012  0.000 - 0.045 ng/mL Final    SARS COV-2 MOLECULAR 10/08/2022 Negative  Negative Final      Labs Reviewed   COMPREHENSIVE METABOLIC PANEL - Abnormal; Notable for the following components:       Result Value    Creatinine 1.21 (*)     Albumin Level 3.3 (*)     Globulin 3.8 (*)     Albumin/Globulin Ratio 0.9 (*)     All other components within normal limits   CBC WITH DIFFERENTIAL - Abnormal; Notable for the following components:    WBC 15.3 (*)     RBC 3.22 (*)     Hgb 10.0 (*)     Hct 31.2 (*)     MCV 96.9 (*)     MCH 31.1 (*)     MCHC 32.1 (*)     RDW 18.8 (*)     MPV 10.6 (*)     IG# 0.10 (*)     All other components within normal limits   MANUAL DIFFERENTIAL - Abnormal; Notable for the following components:    Neut Man 43 (*)     Lymph Man 52 (*)     Abs Lymp 7.956 (*)     All other components within normal limits   APTT - Abnormal; Notable for the following components:    PTT 22.7 (*)     All other components within normal limits   PROTIME-INR - Normal   TROPONIN I - Normal   SARS-COV-2 RNA AMPLIFICATION, QUAL - Normal   CBC W/ AUTO DIFFERENTIAL    Narrative:     The following orders were created for panel order CBC auto differential.  Procedure                               Abnormality         Status                     ---------                               -----------         ------                     CBC with Differential[[id number]]        Abnormal            Final result               Manual Differential[[id number]]          Abnormal             Final result                 Please view results for these tests on the individual orders.   URINALYSIS, REFLEX TO URINE CULTURE        ECG Results              EKG 12-lead (Preliminary result)  Result time 10/08/22 12:30:05      ED Interpretation by Alvina Carolina MD (10/08/22 12:30:05, [facility] - Emergency Dept, Emergency Medicine)    EKG: Interpreted by Alvina Carolina MD. independently as Normal Sinus Rhythm, Rate 71, Normal Axis, Normal Intervals., normal EKG                                    Imaging Results              CT Chest Without Contrast (Final result)  Result time 10/08/22 12:33:07      Final result by Harris Toribio MD (10/08/22 12:33:07)                   Impression:      Mildly displaced left posterior 9th through 11th rib fractures.    No pneumothorax.    Lower lobe predominant fibrotic changes, suspicious for idiopathic pulmonary fibrosis.      Electronically signed by: Harris Toribio MD  Date:    10/08/2022  Time:    12:33               Narrative:    EXAMINATION:  CT of the chest without contrast    CLINICAL HISTORY:  Injury, pain Routine CT of the chest was performed without contrast    Total DLP: 183 mGy.cm    Automatic exposure control was utilized to reduce the patient's dose    COMPARISON:  07/14/2022    FINDINGS:  Mildly displaced fracture of the left posterior 9th through 11th ribs noted.  No other fracture.  No osseous destruction.    There is no interval consolidation or pneumothorax.  There is persistent peripheral predominant interstitial opacities with subpleural opacities and mild honeycomb and with lower lobe predominance, suspicious for idiopathic pulmonary fibrosis.  The thoracic aorta normal caliber.  No axillary, hilar, mediastinal adenopathy.  Vascular calcification noted.  Calcified splenic granuloma noted.  Calcified hepatic granuloma noted.  No pericardial pleural effusion.                                       CT Head Without Contrast (Final result)   Result time 10/08/22 12:27:13      Final result by Harris Toribio MD (10/08/22 12:27:13)                   Impression:      No acute intracranial abnormality.  Chronic changes as above.      Electronically signed by: Harris Toribio MD  Date:    10/08/2022  Time:    12:27               Narrative:    EXAMINATION:  CT of the head without contrast    CLINICAL HISTORY:  Fall, pain, injury    COMPARISON:  None    TECHNIQUE:  Routine CT of the head was performed without contrast    Total DLP: 981 mGy.cm    Automatic exposure control was utilized to reduce the patient's dose    FINDINGS:  There is no acute intracranial hemorrhage, midline shift, mass-effect, or extra-axial collection.  Intracranial vascular calcifications noted.  The ventricles and sulci are globally and proportionately prominent, compatible with moderate involutional changes of the brain.  There are white matter areas of decreased attenuation in the periventricular and subcortical matter, while nonspecific, most commonly sequelae of chronic microvascular ischemia.  No sulcal effacement.  Gray-white matter differentiation is preserved.  Visualized osseous structures are intact.  There is mucous retention cyst noted within the left posterior ethmoid air cells.  The                                       X-Ray Ribs 2 View Left (Final result)  Result time 10/08/22 11:51:27      Final result by Harris Toribio MD (10/08/22 11:51:27)                   Impression:      Nondisplaced left posterior 9th rib fracture.      Electronically signed by: Harris Toribio MD  Date:    10/08/2022  Time:    11:51               Narrative:    EXAMINATION:  Left ribs two views    CLINICAL HISTORY:  Fall, pain, injury    COMPARISON:  None.  Correlation with chest radiograph dated 07/12/2022    FINDINGS:  Nondisplaced left 9th rib fracture noted.  No visible pneumothorax.  There are increased interstitial markings in the left lung likely reflecting chronic interstitial lung  disease.                                       X-Ray Chest 1 View (Final result)  Result time 10/08/22 11:53:39      Final result by Harris Toribio MD (10/08/22 11:53:39)                   Impression:      No acute process.    Chronic interstitial changes.      Electronically signed by: Harris Toribio MD  Date:    10/08/2022  Time:    11:53               Narrative:    EXAMINATION:  Chest one view    CLINICAL HISTORY:  Fall, pain, constipation    COMPARISON:  07/12/2022    FINDINGS:  Cardiac silhouette is normal in size.  Central vessels are normal.  There are similar prominent interstitial markings.  No confluent airspace disease.  No visible pneumothorax or pleural effusion.                                       X-Ray Abdomen AP 1 View (Final result)  Result time 10/08/22 11:42:32      Final result by Harris Toribio MD (10/08/22 11:42:32)                   Impression:      Nonobstructive bowel gas pattern.      Electronically signed by: Harris Toribio MD  Date:    10/08/2022  Time:    11:42               Narrative:    EXAMINATION:  Abdomen one view    CLINICAL HISTORY:  Fall, pain, constipation    COMPARISON:  07/12/2022    FINDINGS:  The bowel gas pattern is nonobstructive.  There is no suspicious calcification.  There is moderate to large amount of retained stool seen in the right colon.  Small amount of retained stool seen in the left colon.                                       Medications   morphine injection 2 mg (2 mg Intravenous Given 10/8/22 1127)   lactated ringers bolus 500 mL (0 mLs Intravenous Stopped 10/8/22 1226)   ondansetron injection 2 mg (2 mg Intravenous Given 10/8/22 1127)           APC / Resident Notes:     I'm Dr. Carolina, I spent face-to-face time with this patient.     I participated in the following activities of this patients care: the medical history.   I personally performed: the physical exam, the medical decision making.   The case was discussed with: the nurse practitioner.    Evaluation and management service: I agree with the evaluation and management decisions made in this patient's care.   Results interpretation: I agree with the study interpretation in this patient's care.     Patient presented with a syncopal episode and fall with the left rib pain which is keeping him from moving and event having a bowel movement, he says when he stays still he is okay but the minute he moves starts hurting very bad.  Initially was seen by nurse practitioner workup started, I did examine him personally and he did have significant tenderness in the left posterior ribcage with 1 rib appears to be a little unstable,      On examination    Awake, alert, oriented, no distress.  Heart: S1, S2 are audible.  There is no S3 no S4, no murmurs.  Chest is clear to auscultation.  No rales, no rhonchi.  Significant tenderness in the left posterior rib,      Management and plan    CT chest shows that he has 3 broken ribs with some pulmonary fibrotic changes but no contusion of the lung at this time, since he meets trauma criteria elderly 88-year-old patient with 3 ribs broken and unable to move and is not even able to get a bowel movement I decided to call [facility] for possible evaluation by Trauma, patient has been accepted by Dr. Bledsoe and I am going to transfer the patient over to [facility] for admission and further management.  He did have syncopal episode he will need workup for syncopal episode also.  He can always see the cardiologist there.      ED Course as of 10/08/22 1327   Sat Oct 08, 2022   1252 WBC 15.3, anemia at his baseline, CT head no acute findings, CT chest chronic fibrotic changes with fractures ribs 9, 10, and 11, the patient is in so much pain he cannot pivot, cannot strain, is having difficulty moving around at all, is guarding his excursion making high risk for pneumonia, technically qualified for trauma services due to age greater than 65 with more  than 2 fractured ribs, will discuss his case with Ge and trauma services, I am passing care to Dr. Carolina at this time. [EB]      ED Course User Index  [EB] STANTON Alaniz                 Clinical Impression:   Final diagnoses:  [W19.XXXA] Fall  [R52] Pain  [K59.00] Constipation  [R42] Dizziness  [S22.42XA] Closed fracture of multiple ribs of left side, initial encounter (Primary)  [W19.XXXA] Fall, initial encounter        ED Disposition Condition    Transfer to Another Facility Stable                Alvina Carolina MD  10/08/22 3989

## 2022-10-08 NOTE — ED PROVIDER NOTES
Encounter Date: 10/8/2022       History     Chief Complaint   Patient presents with    Transfer      Pt. Transferred from [facility] s/t fall x 4 days ago, pt. Dx with multiple rib fractures.      This is a pleasant 88-year-old gentleman past medical history significant for carotid stenosis, hyperlipidemia, hypertension, anemia, hypothyroidism, eczema, and macular degeneration  who presents to the emergency department chief complaint of left-sided rib pain.  He reports four days ago on 10/04 he woke approximately 4 in the morning felt dizzy fell backwards and landed on a fan on the ground.  Since that time he has had significant rib pain on the left side.  He did not seek any medical care until today when he present to outside hospital.  He was noted to have a 9th, 10th, 11th rib fracture was transferred for ICU.  He only complains of pain to his left ribs.  He denies striking his head, any loss conscious, any neck pain.  He is not on any blood thinners.      Fall  The accident occurred several days ago. The fall occurred while walking. Distance fallen: Standing. Impact surface: Fan. There was no blood loss. Point of impact: Left posterior chest. Pain location: Left ribs. The pain is at a severity of 9/10. He was Ambulatory at the scene. There was No entrapment after the fall. There was No drug use involved in the accident. Pertinent negatives include no neck pain, no back pain, no fever, no numbness, no abdominal pain, no bowel incontinence, no nausea, no vomiting, no hematuria and no headaches. The symptoms are aggravated by activity, standing, flexion, extension, ambulation and pressure on the injury.   Review of patient's allergies indicates:  No Known Allergies  Past Medical History:   Diagnosis Date    Carotid stenosis     Hypercholesterolemia     Hypertension     Hypothyroidism     Macular degeneration      No past surgical history on file.  No family history on file.  Social History     Tobacco Use    Smoking  status: Never    Smokeless tobacco: Never   Substance Use Topics    Alcohol use: Not Currently    Drug use: Never     Review of Systems   Constitutional:  Negative for chills, fatigue and fever.   HENT:  Negative for congestion, ear discharge, ear pain, nosebleeds, rhinorrhea and sore throat.    Eyes:  Negative for pain, redness and visual disturbance.   Respiratory:  Negative for cough, chest tightness and shortness of breath.    Cardiovascular:  Positive for chest pain. Negative for leg swelling.   Gastrointestinal:  Negative for abdominal pain, blood in stool, bowel incontinence, constipation, diarrhea, nausea and vomiting.   Genitourinary:  Negative for dysuria and hematuria.   Musculoskeletal:  Negative for arthralgias, back pain, joint swelling, myalgias and neck pain.   Skin:  Negative for color change, pallor and wound.   Neurological:  Negative for dizziness, weakness, light-headedness, numbness and headaches.     Physical Exam     Initial Vitals [10/08/22 1432]   BP Pulse Resp Temp SpO2   130/73 72 20 97.5 °F (36.4 °C) 97 %      MAP       --         Physical Exam    Nursing note and vitals reviewed.  Constitutional: He appears well-developed and well-nourished. He is not diaphoretic. No distress.   HENT:   Head: Normocephalic and atraumatic.   Right Ear: External ear normal.   Left Ear: External ear normal.   Nose: Nose normal.   Mouth/Throat: Oropharynx is clear and moist.   Eyes: Conjunctivae and EOM are normal. Pupils are equal, round, and reactive to light. Right eye exhibits no discharge. Left eye exhibits no discharge.   Neck: Neck supple. No tracheal deviation present.   Normal range of motion.  Cardiovascular:  Normal rate, regular rhythm, normal heart sounds and intact distal pulses.     Exam reveals no gallop and no friction rub.       No murmur heard.  Pulmonary/Chest: Breath sounds normal. No stridor. No respiratory distress. He has no wheezes. He has no rhonchi. He has no rales. He exhibits  tenderness.   There is tenderness palpation of small abrasion over the posterior left lateral chest.  There are bilateral breath sounds are equal.   Abdominal: Abdomen is soft. Bowel sounds are normal. He exhibits no distension and no mass. There is no abdominal tenderness. There is no guarding.   Musculoskeletal:         General: No tenderness or edema. Normal range of motion.      Cervical back: Normal range of motion and neck supple.     Neurological: He is alert and oriented to person, place, and time. No cranial nerve deficit or sensory deficit.   Skin: Skin is warm and dry. Capillary refill takes less than 2 seconds. No rash noted. No erythema. No pallor.       ED Course   Procedures  Labs Reviewed - No data to display       Imaging Results    None          Medications - No data to display  Medical Decision Making:   Initial Assessment:   Patient presents as transfer from outside hospital with 3 rib fractures on left side  Differential Diagnosis:   Rib fractures, pneumo, leukocytosis  Clinical Tests:   Lab Tests: Reviewed  Radiological Study: Reviewed  ED Management:  Patient is awake, alert, he has significant pain to his left chest.  Pain with breathing, coughing.  He does have a 3 year fractures on the left on CT.  No other injuries.  Does have leukocytosis, unsure why.  No fever here and no pneumonia on chest x-ray or CT.  Due to pain control advanced age patient will be admitted to Trauma Service on the floor.  There was consideration admitting to ICU but after discussion with trauma team patient is stable for floor.  Patient is amenable to plan.  Care transferred.                        Clinical Impression:   Final diagnoses:  [S22.49XA] Multiple rib fractures      ED Disposition Condition    Admit                 Yung Guajardo MD  10/08/22 [id number]

## 2022-10-09 VITALS
BODY MASS INDEX: 19.82 KG/M2 | HEIGHT: 71 IN | HEART RATE: 72 BPM | WEIGHT: 141.56 LBS | TEMPERATURE: 98 F | OXYGEN SATURATION: 97 % | DIASTOLIC BLOOD PRESSURE: 64 MMHG | SYSTOLIC BLOOD PRESSURE: 107 MMHG | RESPIRATION RATE: 18 BRPM

## 2022-10-09 PROBLEM — S22.49XA MULTIPLE RIB FRACTURES: Status: RESOLVED | Noted: 2022-10-09 | Resolved: 2022-10-09

## 2022-10-09 PROBLEM — S22.49XA MULTIPLE RIB FRACTURES: Status: ACTIVE | Noted: 2022-10-09

## 2022-10-09 LAB
ALBUMIN SERPL-MCNC: 2.8 GM/DL (ref 3.4–4.8)
ALBUMIN/GLOB SERPL: 0.9 RATIO (ref 1.1–2)
ALP SERPL-CCNC: 74 UNIT/L (ref 40–150)
ALT SERPL-CCNC: 18 UNIT/L (ref 0–55)
AST SERPL-CCNC: 15 UNIT/L (ref 5–34)
BASOPHILS # BLD AUTO: 0.04 X10(3)/MCL (ref 0–0.2)
BASOPHILS NFR BLD AUTO: 0.4 %
BILIRUBIN DIRECT+TOT PNL SERPL-MCNC: 0.7 MG/DL
BUN SERPL-MCNC: 24.8 MG/DL (ref 8.4–25.7)
CALCIUM SERPL-MCNC: 8.6 MG/DL (ref 8.8–10)
CHLORIDE SERPL-SCNC: 107 MMOL/L (ref 98–107)
CO2 SERPL-SCNC: 27 MMOL/L (ref 23–31)
CREAT SERPL-MCNC: 1.17 MG/DL (ref 0.73–1.18)
EOSINOPHIL # BLD AUTO: 0.08 X10(3)/MCL (ref 0–0.9)
EOSINOPHIL NFR BLD AUTO: 0.7 %
ERYTHROCYTE [DISTWIDTH] IN BLOOD BY AUTOMATED COUNT: 18.6 % (ref 11.5–17)
GFR SERPLBLD CREATININE-BSD FMLA CKD-EPI: 60 MLS/MIN/1.73/M2
GLOBULIN SER-MCNC: 3.1 GM/DL (ref 2.4–3.5)
GLUCOSE SERPL-MCNC: 93 MG/DL (ref 82–115)
HCT VFR BLD AUTO: 28.4 % (ref 42–52)
HGB BLD-MCNC: 9.2 GM/DL (ref 14–18)
IMM GRANULOCYTES # BLD AUTO: 0.07 X10(3)/MCL (ref 0–0.04)
IMM GRANULOCYTES NFR BLD AUTO: 0.6 %
LYMPHOCYTES # BLD AUTO: 4.55 X10(3)/MCL (ref 0.6–4.6)
LYMPHOCYTES NFR BLD AUTO: 42.1 %
MAGNESIUM SERPL-MCNC: 2.1 MG/DL (ref 1.6–2.6)
MCH RBC QN AUTO: 31.2 PG (ref 27–31)
MCHC RBC AUTO-ENTMCNC: 32.4 MG/DL (ref 33–36)
MCV RBC AUTO: 96.3 FL (ref 80–94)
MONOCYTES # BLD AUTO: 1.22 X10(3)/MCL (ref 0.1–1.3)
MONOCYTES NFR BLD AUTO: 11.3 %
NEUTROPHILS # BLD AUTO: 4.9 X10(3)/MCL (ref 2.1–9.2)
NEUTROPHILS NFR BLD AUTO: 44.9 %
NRBC BLD AUTO-RTO: 0.2 %
PHOSPHATE SERPL-MCNC: 3.2 MG/DL (ref 2.3–4.7)
PLATELET # BLD AUTO: 254 X10(3)/MCL (ref 130–400)
PMV BLD AUTO: 11 FL (ref 7.4–10.4)
POTASSIUM SERPL-SCNC: 5 MMOL/L (ref 3.5–5.1)
POTASSIUM SERPL-SCNC: 5.2 MMOL/L (ref 3.5–5.1)
PROT SERPL-MCNC: 5.9 GM/DL (ref 5.8–7.6)
RBC # BLD AUTO: 2.95 X10(6)/MCL (ref 4.7–6.1)
SODIUM SERPL-SCNC: 137 MMOL/L (ref 136–145)
WBC # SPEC AUTO: 10.8 X10(3)/MCL (ref 4.5–11.5)

## 2022-10-09 PROCEDURE — 84100 ASSAY OF PHOSPHORUS: CPT | Performed by: STUDENT IN AN ORGANIZED HEALTH CARE EDUCATION/TRAINING PROGRAM

## 2022-10-09 PROCEDURE — 25000003 PHARM REV CODE 250: Performed by: STUDENT IN AN ORGANIZED HEALTH CARE EDUCATION/TRAINING PROGRAM

## 2022-10-09 PROCEDURE — 84132 ASSAY OF SERUM POTASSIUM: CPT | Performed by: STUDENT IN AN ORGANIZED HEALTH CARE EDUCATION/TRAINING PROGRAM

## 2022-10-09 PROCEDURE — 63600175 PHARM REV CODE 636 W HCPCS: Performed by: STUDENT IN AN ORGANIZED HEALTH CARE EDUCATION/TRAINING PROGRAM

## 2022-10-09 PROCEDURE — 36415 COLL VENOUS BLD VENIPUNCTURE: CPT | Performed by: STUDENT IN AN ORGANIZED HEALTH CARE EDUCATION/TRAINING PROGRAM

## 2022-10-09 PROCEDURE — 80053 COMPREHEN METABOLIC PANEL: CPT | Performed by: STUDENT IN AN ORGANIZED HEALTH CARE EDUCATION/TRAINING PROGRAM

## 2022-10-09 PROCEDURE — 85025 COMPLETE CBC W/AUTO DIFF WBC: CPT | Performed by: STUDENT IN AN ORGANIZED HEALTH CARE EDUCATION/TRAINING PROGRAM

## 2022-10-09 PROCEDURE — 83735 ASSAY OF MAGNESIUM: CPT | Performed by: STUDENT IN AN ORGANIZED HEALTH CARE EDUCATION/TRAINING PROGRAM

## 2022-10-09 RX ORDER — SENNOSIDES 8.6 MG/1
8.6 TABLET ORAL DAILY PRN
Status: DISCONTINUED | OUTPATIENT
Start: 2022-10-09 | End: 2022-10-09 | Stop reason: HOSPADM

## 2022-10-09 RX ORDER — OXYCODONE HYDROCHLORIDE 5 MG/1
5 TABLET ORAL EVERY 4 HOURS PRN
Qty: 12 TABLET | Refills: 0 | Status: SHIPPED | OUTPATIENT
Start: 2022-10-09 | End: 2022-10-09 | Stop reason: SDUPTHER

## 2022-10-09 RX ORDER — DOCUSATE SODIUM 100 MG/1
100 CAPSULE, LIQUID FILLED ORAL DAILY
Status: DISCONTINUED | OUTPATIENT
Start: 2022-10-09 | End: 2022-10-09 | Stop reason: HOSPADM

## 2022-10-09 RX ORDER — OXYCODONE HYDROCHLORIDE 5 MG/1
5 TABLET ORAL EVERY 4 HOURS PRN
Qty: 12 TABLET | Refills: 0 | Status: SHIPPED | OUTPATIENT
Start: 2022-10-09 | End: 2022-10-10

## 2022-10-09 RX ORDER — POLYETHYLENE GLYCOL 3350 17 G/17G
17 POWDER, FOR SOLUTION ORAL DAILY
Status: DISCONTINUED | OUTPATIENT
Start: 2022-10-09 | End: 2022-10-09 | Stop reason: HOSPADM

## 2022-10-09 RX ORDER — SENNOSIDES 8.6 MG/1
1 TABLET ORAL DAILY PRN
Qty: 10 TABLET | Refills: 1 | Status: SHIPPED | OUTPATIENT
Start: 2022-10-09

## 2022-10-09 RX ADMIN — FAMOTIDINE 20 MG: 20 TABLET, FILM COATED ORAL at 09:10

## 2022-10-09 RX ADMIN — MONTELUKAST 10 MG: 10 TABLET, FILM COATED ORAL at 09:10

## 2022-10-09 RX ADMIN — POLYETHYLENE GLYCOL 3350 17 G: 17 POWDER, FOR SOLUTION ORAL at 09:10

## 2022-10-09 RX ADMIN — SENNOSIDES 8.6 MG: 8.6 TABLET, FILM COATED ORAL at 09:10

## 2022-10-09 RX ADMIN — TAMSULOSIN HYDROCHLORIDE 0.4 MG: 0.4 CAPSULE ORAL at 09:10

## 2022-10-09 RX ADMIN — ACETAMINOPHEN 650 MG: 325 TABLET, FILM COATED ORAL at 11:10

## 2022-10-09 RX ADMIN — ATORVASTATIN CALCIUM 20 MG: 10 TABLET, FILM COATED ORAL at 09:10

## 2022-10-09 RX ADMIN — OXYCODONE 5 MG: 5 TABLET ORAL at 02:10

## 2022-10-09 RX ADMIN — OXYCODONE 5 MG: 5 TABLET ORAL at 04:10

## 2022-10-09 RX ADMIN — SODIUM CHLORIDE, POTASSIUM CHLORIDE, SODIUM LACTATE AND CALCIUM CHLORIDE: 600; 310; 30; 20 INJECTION, SOLUTION INTRAVENOUS at 06:10

## 2022-10-09 RX ADMIN — ACETAMINOPHEN 650 MG: 325 TABLET, FILM COATED ORAL at 12:10

## 2022-10-09 RX ADMIN — ACETAMINOPHEN 650 MG: 325 TABLET, FILM COATED ORAL at 06:10

## 2022-10-09 RX ADMIN — LEVOTHYROXINE SODIUM 75 MCG: 25 TABLET ORAL at 09:10

## 2022-10-09 NOTE — PT/OT/SLP EVAL
Physical Therapy Evaluation    Patient Name:  Tony Mckeon   MRN:  [id number]    Recommendations:     Discharge Recommendations:  home with home health (and family assist)   Discharge Equipment Recommendations: walker, rolling   Barriers to discharge:  acuity of illness    Assessment:     Tony Mckeon is a 88 y.o. male admitted with a medical diagnosis of fall w/ L 9-11th rib fxs.  He presents with the following impairments/functional limitations:  weakness, impaired endurance, impaired functional mobility, gait instability, impaired balance, pain. Pt has dc'd the hospital since PT IE but was planned to continue PT services had pt remained at the hospital. Daughter was made aware of importance of having supervision from family at home when pt was mobilizing OOB; verbalized understanding.    Rehab Prognosis: Good; patient would benefit from acute skilled PT services to address these deficits and reach maximum level of function.    Recent Surgery: * No surgery found *      Plan:     During this hospitalization, patient to be seen 6 x/week to address the identified rehab impairments via gait training, therapeutic activities, therapeutic exercises, neuromuscular re-education and progress toward the following goals:    Plan of Care Expires:  11/08/22    Subjective     Chief Complaint: pain  Patient/Family Comments/goals: to go home  Pain/Comfort:  Pain Rating 1: 7/10  Location 1: rib(s)  Pain Addressed 1: Nurse notified    Patients cultural, spiritual, Yarsanism conflicts given the current situation: no    Living Environment:  Pt lives w/ his wife in a SL home, 2 steps to enter w/ B railing.  Prior to admission, patients level of function was independent.  Equipment used at home: none.  DME owned (not currently used): none.  Upon discharge, patient will have assistance from family.    Objective:     Communicated with RN prior to session.  Patient found HOB elevated with peripheral IV, bed alarm  upon PT entry  to room.    General Precautions: Standard, fall   Orthopedic Precautions:N/A   Braces: N/A  Respiratory Status: Room air    Exams:  Cognitive Exam:  Patient is oriented to Person, Place, Time, and Situation    Functional Mobility:  Bed Mobility:     Supine to Sit: minimum assistance  Sit to Supine: minimum assistance  Transfers:     Sit to Stand:  stand by assistance with rolling walker  Gait: pt demo'd an unsteady gt x 15 feet w/out RW so pt giving a RW and was able to ambulate an additional 200 ft w/ SBA and Vcs to slow down for safety.       AM-PAC 6 CLICK MOBILITY  Total Score:18     Patient left HOB elevated with all lines intact, call button in reach, bed alarm on, RN notified, and daughter present.    GOALS:     History:     Past Medical History:   Diagnosis Date    Carotid stenosis     Hypercholesterolemia     Hypertension     Hypothyroidism     Macular degeneration        History reviewed. No pertinent surgical history.    Time Tracking:     PT Received On: 10/09/22  PT Start Time: 1139     PT Stop Time: 1155  PT Total Time (min): 16 min     Billable Minutes: Evaluation , moderate complexity      10/09/2022

## 2022-10-09 NOTE — NURSING
Nurses Note -- 4 Eyes      10/8/2022   11:57 PM      Skin assessed during: Admit      [x] No Pressure Injuries Present    []Prevention Measures Documented      [x] Yes- Altered Skin Integrity Present or Discovered   [] LDA Added if Not in Epic (Describe Wound)   [x] New Altered Skin Integrity was Present on Admit and Documented in LDA   [] Wound Image Taken    Wound Care Consulted? No    Attending Nurse:  Consuelo Sheikh RN     Second RN/Staff Member:   VIRIDIANA Meredith rn

## 2022-10-09 NOTE — PLAN OF CARE
Clinicals sent via [facility] to [facility] patient has serviceswith [facility]. Rolling Walker ordered from [facility] spoke to daughter she can  at the [place] location in the am.

## 2022-10-09 NOTE — DISCHARGE SUMMARY
[facility] General Surgery  Discharge Summary    Admit Date: 10/8/2022  Discharge Date: 10/9/2022  Admitting Physician: Jesus Allison Jr., MD  Consulting Physicians(s): none    Admission HPI:   89yo M with hx carotid stenois,s HTN, HLD, eczema, hypothyroidism, anemia, macular degeneration who presents to the ED as a transfer from OSH with L sided rib pain following a fall 4 days ago. He reports waking up early in the morning and, upon standing, he fell backwards and landed on a fan on the floor hitting his left chest in that process. Denies LOC or head trauma. Does not take blood thinners. Following the event he was able to quickly get up and did not have any additional episodes of dizziness. He has had L sided chest/rib pain since that time and due to the longevity of this pain he decided to present to an external facility today for evaluation.    Hospital Course:   Pt admitted 10/8 to trauma surgery as a transfer from OSH following a fall 5 days ago. Pt's pain controlled. Meeting d/c criteria.    Procedures Performed: none    Final Diagnoses:   Active Hospital Problems   No active problems to display.      Resolved Hospital Problems    Diagnosis Date Resolved POA    *Multiple rib fractures [S22.49XA] 10/09/2022 Unknown       Discharge Condition: stable    Disposition: home    Discharge Medications:  Current Discharge Medication List        START taking these medications    Details   oxyCODONE (ROXICODONE) 5 MG immediate release tablet Take 1 tablet (5 mg total) by mouth every 4 (four) hours as needed for Pain.  Qty: 12 tablet, Refills: 0    Comments: Quantity prescribed more than 7 day supply? Yes, quantity medically necessary      senna (SENOKOT) 8.6 mg tablet Take 1 tablet by mouth daily as needed for Constipation.  Qty: 10 tablet, Refills: 1           CONTINUE these medications which have NOT CHANGED    Details   atorvastatin (LIPITOR) 20 MG tablet Take 20 mg by mouth once daily.      docusate sodium (COLACE) 100  MG capsule Take 1 capsule (100 mg total) by mouth 2 (two) times daily.  Qty: 60 capsule, Refills: 6      famotidine (PEPCID) 20 MG tablet Take 20 mg by mouth once daily.      fluticasone propionate (FLONASE) 50 mcg/actuation nasal spray by Each Nostril route.      folic acid (FOLVITE) 1 MG tablet Take 1,000 mcg by mouth once daily.      hydrocortisone 2.5 % cream Apply topically.      hydrOXYzine HCL (ATARAX) 25 MG tablet Take 25 mg by mouth once daily.      levothyroxine (SYNTHROID) 75 MCG tablet Take 75 mcg by mouth once daily.      montelukast (SINGULAIR) 10 mg tablet Take 10 mg by mouth once daily.      predniSONE (DELTASONE) 10 MG tablet Take 10 mg by mouth once daily.      tamsulosin (FLOMAX) 0.4 mg Cap Take 1 capsule (0.4 mg total) by mouth once daily.  Qty: 30 capsule, Refills: 11    Comments: To be taken with evening meal      triamcinolone acetonide 0.1% (KENALOG) 0.1 % cream Apply 1 g topically 2 (two) times daily as needed.           TERTIARY TRAUMA SURVEY (TTS)    List Injuries Identified to Date:  Mildly displaced left posterior 9th through 11th rib fractures.  Idiopathic pulmonary fibrosis    List Operative and Procedures:  1. none    No past surgical history on file.    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    Dehydration 07/12/2022    Constipation 07/12/2022    Hypotension 07/12/2022    Sepsis 07/12/2022    Multiple closed fractures of ribs of left side 10/08/2022    Fall 10/08/2022    Dizziness 10/08/2022     Resolved Ambulatory Problems     Diagnosis Date Noted    Hypertension 07/12/2022    Macular degeneration 07/12/2022     Past Medical History:   Diagnosis Date    Carotid stenosis     Hypercholesterolemia      Past Medical History:   Diagnosis Date    Carotid stenosis     Hypercholesterolemia     Hypertension     Hypothyroidism     Macular degeneration           Physical Exam  Vitals reviewed.   Constitutional:       General: He is not in acute distress.     Appearance: He is normal weight. He is not toxic-appearing.   HENT:      Head: Normocephalic and atraumatic.      Right Ear: External ear normal.      Left Ear: External ear normal.      Nose: Nose normal.      Mouth/Throat:      Mouth: Mucous membranes are moist.      Pharynx: Oropharynx is clear.   Eyes:      Extraocular Movements: Extraocular movements intact.      Conjunctiva/sclera: Conjunctivae normal.      Pupils: Pupils are equal, round, and reactive to light.   Cardiovascular:      Rate and Rhythm: Normal rate.      Pulses: Normal pulses.      Comments: TTP L chest  Pulmonary:      Effort: Pulmonary effort is normal.   Abdominal:      General: Abdomen is flat. There is no distension.      Palpations: Abdomen is soft.      Tenderness: There is no abdominal tenderness.   Musculoskeletal:         General: Normal range of motion.      Cervical back: Normal range of motion and neck supple.      Right lower leg: No edema.      Left lower leg: No edema.   Skin:     General: Skin is warm.      Comments: +eczema   Neurological:      General: No focal deficit present.      Mental Status: He is alert and oriented to person, place, and time. Mental status is at baseline.      Cranial Nerves: No cranial nerve deficit.      Motor: No weakness.   Psychiatric:         Mood and Affect: Mood normal.         Behavior: Behavior normal.         Imaging Review:  X-Ray Chest 1 View    Result Date: 10/8/2022  EXAMINATION:  Chest one view    CLINICAL HISTORY:  Fall, pain, constipation    COMPARISON:  07/12/2022    FINDINGS:  Cardiac silhouette is normal in size.  Central vessels are normal.  There are similar prominent interstitial markings.  No confluent airspace disease.  No visible pneumothorax or pleural effusion.        X-Ray Ribs 2 View Left    Result Date: 10/8/2022  EXAMINATION:  Left ribs two views    CLINICAL  HISTORY:  Fall, pain, injury    COMPARISON:  None.  Correlation with chest radiograph dated 07/12/2022    FINDINGS:  Nondisplaced left 9th rib fracture noted.  No visible pneumothorax.  There are increased interstitial markings in the left lung likely reflecting chronic interstitial lung disease.        X-Ray Abdomen AP 1 View    Result Date: 10/8/2022  EXAMINATION:  Abdomen one view    CLINICAL HISTORY:  Fall, pain, constipation    COMPARISON:  07/12/2022    FINDINGS:  The bowel gas pattern is nonobstructive.  There is no suspicious calcification.  There is moderate to large amount of retained stool seen in the right colon.  Small amount of retained stool seen in the left colon.        CT Head Without Contrast    Result Date: 10/8/2022  EXAMINATION:  CT of the head without contrast    CLINICAL HISTORY:  Fall, pain, injury    COMPARISON:  None    TECHNIQUE:  Routine CT of the head was performed without contrast    Total DLP: 981 mGy.cm    Automatic exposure control was utilized to reduce the patient's dose    FINDINGS:  There is no acute intracranial hemorrhage, midline shift, mass-effect, or extra-axial collection.  Intracranial vascular calcifications noted.  The ventricles and sulci are globally and proportionately prominent, compatible with moderate involutional changes of the brain.  There are white matter areas of decreased attenuation in the periventricular and subcortical matter, while nonspecific, most commonly sequelae of chronic microvascular ischemia.  No sulcal effacement.  Gray-white matter differentiation is preserved.  Visualized osseous structures are intact.  There is mucous retention cyst noted within the left posterior ethmoid air cells.  The        CT Chest Without Contrast    Result Date: 10/8/2022  EXAMINATION:  CT of the chest without contrast    CLINICAL HISTORY:  Injury, pain Routine CT of the chest was performed without contrast    Total DLP: 183 mGy.cm    Automatic exposure control was  utilized to reduce the patient's dose    COMPARISON:  07/14/2022    FINDINGS:  Mildly displaced fracture of the left posterior 9th through 11th ribs noted.  No other fracture.  No osseous destruction.    There is no interval consolidation or pneumothorax.  There is persistent peripheral predominant interstitial opacities with subpleural opacities and mild honeycomb and with lower lobe predominance, suspicious for idiopathic pulmonary fibrosis.  The thoracic aorta normal caliber.  No axillary, hilar, mediastinal adenopathy.  Vascular calcification noted.  Calcified splenic granuloma noted.  Calcified hepatic granuloma noted.  No pericardial pleural effusion.        Results for orders placed or performed during the hospital encounter of 10/08/22   Comprehensive metabolic panel   Result Value Ref Range    Sodium Level 137 136 - 145 mmol/L    Potassium Level 5.2 (H) 3.5 - 5.1 mmol/L    Chloride 107 98 - 107 mmol/L    Carbon Dioxide 27 23 - 31 mmol/L    Glucose Level 93 82 - 115 mg/dL    Blood Urea Nitrogen 24.8 8.4 - 25.7 mg/dL    Creatinine 1.17 0.73 - 1.18 mg/dL    Calcium Level Total 8.6 (L) 8.8 - 10.0 mg/dL    Protein Total 5.9 5.8 - 7.6 gm/dL    Albumin Level 2.8 (L) 3.4 - 4.8 gm/dL    Globulin 3.1 2.4 - 3.5 gm/dL    Albumin/Globulin Ratio 0.9 (L) 1.1 - 2.0 ratio    Bilirubin Total 0.7 <=1.5 mg/dL    Alkaline Phosphatase 74 40 - 150 unit/L    Alanine Aminotransferase 18 0 - 55 unit/L    Aspartate Aminotransferase 15 5 - 34 unit/L    eGFR 60 mls/min/1.73/m2   Magnesium   Result Value Ref Range    Magnesium Level 2.10 1.60 - 2.60 mg/dL   Phosphorus   Result Value Ref Range    Phosphorus Level 3.2 2.3 - 4.7 mg/dL   CBC with Differential   Result Value Ref Range    WBC 10.8 4.5 - 11.5 x10(3)/mcL    RBC 2.95 (L) 4.70 - 6.10 x10(6)/mcL    Hgb 9.2 (L) 14.0 - 18.0 gm/dL    Hct 28.4 (L) 42.0 - 52.0 %    MCV 96.3 (H) 80.0 - 94.0 fL    MCH 31.2 (H) 27.0 - 31.0 pg    MCHC 32.4 (L) 33.0 - 36.0 mg/dL    RDW 18.6 (H) 11.5 - 17.0  %    Platelet 254 130 - 400 x10(3)/mcL    MPV 11.0 (H) 7.4 - 10.4 fL    Neut % 44.9 %    Lymph % 42.1 %    Mono % 11.3 %    Eos % 0.7 %    Basophil % 0.4 %    Lymph # 4.55 0.6 - 4.6 x10(3)/mcL    Neut # 4.9 2.1 - 9.2 x10(3)/mcL    Mono # 1.22 0.1 - 1.3 x10(3)/mcL    Eos # 0.08 0 - 0.9 x10(3)/mcL    Baso # 0.04 0 - 0.2 x10(3)/mcL    IG# 0.07 (H) 0 - 0.04 x10(3)/mcL    IG% 0.6 %    NRBC% 0.2 %   Potassium   Result Value Ref Range    Potassium Level 5.0 3.5 - 5.1 mmol/L       Lab Review:  Troponin:  Recent Labs   Lab Result Units 07/12/22  0642 10/08/22  1119   Troponin-I ng/mL <0.010 0.012       CBC:  Recent Labs   Lab Result Units 07/12/22 0642 07/13/22  0420 08/01/22  1100 10/08/22  1119 10/09/22  0523   WBC x10(3)/mcL 15.1* 14.3* 7.7 15.3* 10.8   RBC x10(6)/mcL 2.57* 2.36* 3.48* 3.22* 2.95*   Hgb gm/dL 8.3* 7.5* 10.7* 10.0* 9.2*   Hct % 26.1* 24.0* 32.6* 31.2* 28.4*   Platelet x10(3)/mcL 242 229 233 270 254   MCV fL 101.6* 101.7* 93.7 96.9* 96.3*   MCH pg 32.3* 31.8* 30.7 31.1* 31.2*   MCHC mg/dL 31.8* 31.3* 32.8* 32.1* 32.4*       CMP:  Recent Labs   Lab Result Units 07/12/22  0642 07/13/22  0420 08/01/22  1100 10/08/22  1119 10/09/22  0523 10/09/22  1053   Calcium Level Total mg/dL 8.6* 8.0* 8.9 9.0 8.6*  --    Albumin Level gm/dL 3.0* 2.4* 2.9* 3.3* 2.8*  --    Sodium Level mmol/L 143 142 137 138 137  --    Potassium Level mmol/L 5.1 4.5 5.1 4.4 5.2* 5.0   Carbon Dioxide mmol/L 22* 21* 25 23 27  --    Blood Urea Nitrogen mg/dL 42.0* 32.0* 31.0* 25.0 24.8  --    Creatinine mg/dL 1.56* 1.53* 1.24* 1.21* 1.17  --    Alkaline Phosphatase unit/L 94 77 103 82 74  --    Alanine Aminotransferase unit/L 25 22 28 24 18  --    Aspartate Aminotransferase unit/L 21 23 22 16 15  --    Bilirubin Total mg/dL 0.7 0.7 0.4 0.7 0.7  --        Yasmine Ivey MD   [facility] General Surgery PGY1  10/09/2022 11:39 AM

## 2022-10-09 NOTE — PLAN OF CARE
Problem: Adult Inpatient Plan of Care  Goal: Plan of Care Review  Outcome: Ongoing, Progressing  Goal: Patient-Specific Goal (Individualized)  Outcome: Ongoing, Progressing  Goal: Absence of Hospital-Acquired Illness or Injury  Outcome: Ongoing, Progressing     Problem: Fall Injury Risk  Goal: Absence of Fall and Fall-Related Injury  Outcome: Ongoing, Progressing     Problem: Impaired Wound Healing  Goal: Optimal Wound Healing  Outcome: Ongoing, Progressing     Problem: Pain Acute  Goal: Acceptable Pain Control and Functional Ability  Outcome: Ongoing, Progressing

## 2022-10-10 ENCOUNTER — PATIENT OUTREACH (OUTPATIENT)
Dept: ADMINISTRATIVE | Facility: CLINIC | Age: 87
End: 2022-10-10
Payer: MEDICARE

## 2022-10-10 RX ORDER — OXYCODONE HYDROCHLORIDE 5 MG/1
5 TABLET ORAL EVERY 4 HOURS PRN
Qty: 12 TABLET | Refills: 0 | Status: CANCELLED | OUTPATIENT
Start: 2022-10-10

## 2022-10-10 RX ORDER — OXYCODONE HYDROCHLORIDE 5 MG/1
5 TABLET ORAL EVERY 4 HOURS PRN
Qty: 12 TABLET | Refills: 0 | Status: SHIPPED | OUTPATIENT
Start: 2022-10-10 | End: 2022-10-18

## 2022-10-10 NOTE — PROGRESS NOTES
C3 nurse spoke with Tony Mckeon for a TCC post hospital discharge follow up call. The patient does not have a scheduled HOSFU appointment with James Marsh MD within 5-7 days post hospital discharge date 10/9/22. C3 nurse was unable to schedule HOSFU appointment in [facility].  C3 nurse advised patient if he did not hear from Dr. Marsh's office then he should call office to schedule f/u appt.  Patient stated he would and that his daughter Regla had also already been in contact with the nurse at the office.

## 2022-10-10 NOTE — CLINICAL REVIEW
88-year-old male admitted on 10/8/2022 and discharged on 10/9/2022 with multiple rib fractures.  There was no evidence of hemodynamic instability, hypoxia, hypercapnia, food intolerance, medication intolerance, flail chest.  Appropriate for observation setting    MD LEN  , Physician Advisor

## 2023-11-20 DIAGNOSIS — G45.9 TRANSIENT ISCHEMIC ATTACK: Primary | ICD-10-CM

## 2023-11-28 ENCOUNTER — HOSPITAL ENCOUNTER (OUTPATIENT)
Dept: RADIOLOGY | Facility: HOSPITAL | Age: 88
Discharge: HOME | End: 2023-11-28
Attending: INTERNAL MEDICINE
Payer: MEDICARE

## 2023-11-28 DIAGNOSIS — G45.9 TRANSIENT ISCHEMIC ATTACK: ICD-10-CM

## 2023-11-28 PROCEDURE — 70551 MRI BRAIN STEM W/O DYE: CPT | Mod: TC

## 2023-12-04 ENCOUNTER — HOSPITAL ENCOUNTER (OUTPATIENT)
Dept: RADIOLOGY | Facility: HOSPITAL | Age: 88
Discharge: HOME | End: 2023-12-04
Attending: INTERNAL MEDICINE
Payer: MEDICARE

## 2023-12-04 DIAGNOSIS — I10 ESSENTIAL HYPERTENSION: ICD-10-CM

## 2023-12-04 LAB
AORTIC ROOT ANNULUS: 3.29 CM
AORTIC VALVE CUSP SEPERATION: 2.05 CM
AV INDEX (PROSTH): 1.25
AV MEAN GRADIENT: 2 MMHG
AV PEAK GRADIENT: 5 MMHG
AV VALVE AREA BY VELOCITY RATIO: 2.88 CM²
AV VALVE AREA: 3.89 CM²
AV VELOCITY RATIO: 0.93
CV ECHO LV RWT: 0.59 CM
DOP CALC AO PEAK VEL: 1.11 M/S
DOP CALC AO VTI: 21.9 CM
DOP CALC LVOT AREA: 3.1 CM2
DOP CALC LVOT DIAMETER: 1.99 CM
DOP CALC LVOT PEAK VEL: 1.03 M/S
DOP CALC LVOT STROKE VOLUME: 85.18 CM3
DOP CALCLVOT PEAK VEL VTI: 27.4 CM
E WAVE DECELERATION TIME: 241.93 MSEC
E/A RATIO: 0.65
ECHO LV POSTERIOR WALL: 1.25 CM (ref 0.6–1.1)
FRACTIONAL SHORTENING: 30 % (ref 28–44)
INTERVENTRICULAR SEPTUM: 1.22 CM (ref 0.6–1.1)
LEFT ATRIUM SIZE: 4.06 CM
LEFT INTERNAL DIMENSION IN SYSTOLE: 2.97 CM (ref 2.1–4)
LEFT VENTRICLE DIASTOLIC VOLUME: 80.52 ML
LEFT VENTRICLE SYSTOLIC VOLUME: 34.14 ML
LEFT VENTRICULAR INTERNAL DIMENSION IN DIASTOLE: 4.24 CM (ref 3.5–6)
LEFT VENTRICULAR MASS: 188.54 G
LVOT MG: 1.26 MMHG
LVOT MV: 0.46 CM/S
MV PEAK A VEL: 0.95 M/S
MV PEAK E VEL: 0.62 M/S
PISA TR MAX VEL: 3.31 M/S
PV PEAK GRADIENT: 3 MMHG
PV PEAK VELOCITY: 0.8 M/S
RIGHT VENTRICULAR END-DIASTOLIC DIMENSION: 2.42 CM
TR MAX PG: 44 MMHG

## 2023-12-04 PROCEDURE — 93306 TTE W/DOPPLER COMPLETE: CPT

## 2023-12-05 DIAGNOSIS — I73.9 INTERMITTENT CLAUDICATION: Primary | ICD-10-CM

## 2023-12-05 DIAGNOSIS — R60.9 EDEMA, UNSPECIFIED: ICD-10-CM

## 2023-12-05 DIAGNOSIS — R60.0 LOCALIZED EDEMA: ICD-10-CM

## 2023-12-18 ENCOUNTER — HOSPITAL ENCOUNTER (OUTPATIENT)
Dept: RADIOLOGY | Facility: HOSPITAL | Age: 88
Discharge: HOME | End: 2023-12-18
Attending: INTERNAL MEDICINE
Payer: MEDICARE

## 2023-12-18 DIAGNOSIS — R93.6 ABNORMAL X-RAY OF LOWER EXTREMITY: Primary | ICD-10-CM

## 2023-12-18 DIAGNOSIS — I73.9 INTERMITTENT CLAUDICATION: ICD-10-CM

## 2023-12-18 DIAGNOSIS — I63.9 IMPENDING CEREBROVASCULAR ACCIDENT: ICD-10-CM

## 2023-12-18 DIAGNOSIS — I70.8 BLOCKAGE OF SUBCLAVIAN ARTERY: ICD-10-CM

## 2023-12-18 DIAGNOSIS — R60.0 LOCALIZED EDEMA: ICD-10-CM

## 2023-12-18 DIAGNOSIS — R60.9 EDEMA, UNSPECIFIED: ICD-10-CM

## 2023-12-18 PROCEDURE — 93970 EXTREMITY STUDY: CPT | Mod: TC

## 2023-12-18 PROCEDURE — 93880 EXTRACRANIAL BILAT STUDY: CPT | Mod: TC

## 2023-12-18 PROCEDURE — 93925 LOWER EXTREMITY STUDY: CPT | Mod: TC

## 2023-12-22 ENCOUNTER — HOSPITAL ENCOUNTER (OUTPATIENT)
Dept: RADIOLOGY | Facility: HOSPITAL | Age: 88
Discharge: HOME | End: 2023-12-22
Attending: INTERNAL MEDICINE
Payer: MEDICARE

## 2023-12-22 DIAGNOSIS — R93.6 ABNORMAL X-RAY OF LOWER EXTREMITY: ICD-10-CM

## 2023-12-22 DIAGNOSIS — I70.8 BLOCKAGE OF SUBCLAVIAN ARTERY: ICD-10-CM

## 2023-12-22 PROCEDURE — 76775 US EXAM ABDO BACK WALL LIM: CPT | Mod: TC

## 2024-01-13 ENCOUNTER — HOSPITAL ENCOUNTER (EMERGENCY)
Facility: HOSPITAL | Age: 89
Discharge: HOME | End: 2024-01-13
Attending: INTERNAL MEDICINE
Payer: MEDICARE

## 2024-01-13 VITALS
DIASTOLIC BLOOD PRESSURE: 84 MMHG | HEART RATE: 77 BPM | OXYGEN SATURATION: 98 % | WEIGHT: 140 LBS | BODY MASS INDEX: 20.73 KG/M2 | HEIGHT: 69 IN | SYSTOLIC BLOOD PRESSURE: 143 MMHG | TEMPERATURE: 97 F | RESPIRATION RATE: 20 BRPM

## 2024-01-13 DIAGNOSIS — S39.012A BACK STRAIN, INITIAL ENCOUNTER: Primary | ICD-10-CM

## 2024-01-13 LAB
APPEARANCE UR: CLEAR
BILIRUB UR QL STRIP.AUTO: NEGATIVE
COLOR UR AUTO: YELLOW
GLUCOSE UR QL STRIP.AUTO: NEGATIVE
KETONES UR QL STRIP.AUTO: NEGATIVE
LEUKOCYTE ESTERASE UR QL STRIP.AUTO: NEGATIVE
NITRITE UR QL STRIP.AUTO: NEGATIVE
PH UR STRIP.AUTO: 7 [PH]
PROT UR QL STRIP.AUTO: NEGATIVE
RBC UR QL AUTO: NEGATIVE
SP GR UR STRIP.AUTO: 1.01 (ref 1–1.03)
UROBILINOGEN UR STRIP-ACNC: 0.2

## 2024-01-13 PROCEDURE — 81003 URINALYSIS AUTO W/O SCOPE: CPT | Performed by: INTERNAL MEDICINE

## 2024-01-13 PROCEDURE — 99284 EMERGENCY DEPT VISIT MOD MDM: CPT | Mod: 25

## 2024-01-13 RX ORDER — TIZANIDINE 4 MG/1
4 TABLET ORAL EVERY 6 HOURS PRN
Qty: 30 TABLET | Refills: 0 | Status: SHIPPED | OUTPATIENT
Start: 2024-01-13 | End: 2024-01-23

## 2024-01-13 NOTE — ED PROVIDER NOTES
Encounter Date: 1/13/2024  History from patient     History     Chief Complaint   Patient presents with    Back Pain     Back pain that start a week ago after being put on plavix     HPI    Tony Mckeon is 89 y.o. male who  has a past medical history of Carotid stenosis, Hypercholesterolemia, Hypertension, Hypothyroidism, Macular degeneration, and Peripheral vascular disease, unspecified. arrives in ER with c/o Back Pain (Back pain that start a week ago after being put on plavix)      Review of patient's allergies indicates:  No Known Allergies  Past Medical History:   Diagnosis Date    Carotid stenosis     Hypercholesterolemia     Hypertension     Hypothyroidism     Macular degeneration     Peripheral vascular disease, unspecified      Past Surgical History:   Procedure Laterality Date    CAROTID ENDARTERECTOMY Left     TONSILLECTOMY       Family History   Problem Relation Age of Onset    COPD Mother     Cancer Father     Lung cancer Father      Social History     Tobacco Use    Smoking status: Never    Smokeless tobacco: Never   Substance Use Topics    Alcohol use: Not Currently    Drug use: Never     Review of Systems   HENT:  Negative for trouble swallowing and voice change.    Eyes:  Negative for visual disturbance.   Respiratory:  Negative for cough and shortness of breath.    Cardiovascular:  Negative for chest pain.   Gastrointestinal:  Negative for abdominal pain, diarrhea and vomiting.   Genitourinary:  Positive for flank pain. Negative for dysuria and hematuria.   Musculoskeletal:  Positive for back pain. Negative for gait problem.        No Pain.   Skin:  Negative for color change and rash.   Neurological:  Negative for headaches.   Psychiatric/Behavioral:  Negative for behavioral problems and sleep disturbance.    All other systems reviewed and are negative.      Physical Exam     Initial Vitals [01/13/24 1047]   BP Pulse Resp Temp SpO2   (!) 143/84 86 20 97.3 °F (36.3 °C) 97 %      MAP       --          Physical Exam    Nursing note and vitals reviewed.  Constitutional: He appears well-developed. No distress.   Elderly patient with kyphoscoliosis   HENT:   Head: Atraumatic.   Eyes: EOM are normal.   Cardiovascular:  Normal heart sounds.           Pulmonary/Chest: Breath sounds normal.   Abdominal: Abdomen is soft. Bowel sounds are normal.   There is right CVA tenderness.  Musculoskeletal:         General: Normal range of motion.      Thoracic back: No bony tenderness.      Lumbar back: No bony tenderness.     Neurological: He is alert.   Speech Normal   Skin: Skin is dry.   Psychiatric: He has a normal mood and affect.   Pleasant       ED Course   Procedures    Orders Placed This Encounter   Procedures    CT Renal Stone Study ABD Pelvis WO    Urinalysis, Reflex to Urine Culture     Medications - No data to display  Admission on 01/13/2024, Discharged on 01/13/2024   Component Date Value Ref Range Status    Color, UA 01/13/2024 Yellow  Yellow, Light-Yellow, Dark Yellow, Helen, Straw Final    Appearance, UA 01/13/2024 Clear  Clear Final    Specific Gravity, UA 01/13/2024 1.015  1.005 - 1.030 Final    pH, UA 01/13/2024 7.0  5.0 - 8.5 Final    Protein, UA 01/13/2024 Negative  Negative Final    Glucose, UA 01/13/2024 Negative  Negative, Normal Final    Ketones, UA 01/13/2024 Negative  Negative Final    Blood, UA 01/13/2024 Negative  Negative Final    Bilirubin, UA 01/13/2024 Negative  Negative Final    Urobilinogen, UA 01/13/2024 0.2  0.2, 1.0, Normal Final    Nitrites, UA 01/13/2024 Negative  Negative Final    Leukocyte Esterase, UA 01/13/2024 Negative  Negative Final       Labs Reviewed   URINALYSIS, REFLEX TO URINE CULTURE - Normal          Imaging Results              CT Renal Stone Study ABD Pelvis WO (Final result)  Result time 01/13/24 11:42:08      Final result by Miguel Yu MD (01/13/24 11:42:08)                   Impression:      No acute process of the abdomen or pelvis.  Other secondary findings as  noted.      Electronically signed by: Miguel Yu MD  Date:    01/13/2024  Time:    11:42               Narrative:    EXAMINATION:  CT RENAL STONE STUDY ABD PELVIS WO    CLINICAL HISTORY:  Flank pain, kidney stone suspected;    TECHNIQUE:  Multiple cross-section of the chain from the lung bases the pubic symphysis without the use of contrast.  Coronal and sagittal reformatted images were obtained.  An automated dose exposure technique was utilized this limits radiation does the patient.    COMPARISON:  None    FINDINGS:  Bibasilar scarring/fibrosis is identified.  Heart size enlarged with coronary calcifications.    Evaluation of hollow and solid viscera is limited secondary to technique.    Liver demonstrates calcified granuloma.  Gallbladder is present.  The spleen, adrenals, and pancreas are normal.  Kidneys are symmetric in size with nonobstructing calculi on the left.  Gallbladder is present.    Small hiatal hernia.  Small bowel is of normal caliber.  Colon is also normal caliber with scattered colonic diverticula.  No adjacent inflammatory changes.  Moderate to large stool burden is identified throughout the colon.  The appendix is not identified.    Prostate is enlarged the bladder is under distended.  No free fluid in the pelvis.  The course and caliber of the abdominal is normal.  No evidence for adenopathy.    No suspicious osseous lesions.  Age-indeterminate compression deformity of the T12 vertebral body.  Spondylotic changes are identified.  The soft tissues are grossly normal.                                       Medications - No data to display  Medical Decision Making    Tony Mckeon is 89 y.o. male who  has a past medical history of Carotid stenosis, Hypercholesterolemia, Hypertension, Hypothyroidism, Macular degeneration, and Peripheral vascular disease, unspecified. arrives in ER with c/o Back Pain (Back pain that start a week ago after being put on plavix)      Patient comes to the  emergency room with complaint of right flank pain which has been going on for a week now, denies any other complaints whatsoever, he did have some constipation which got relieved yesterday, he says he was recently started on Plavix because he has had strokes in the past which did not know about and he has blockage in the legs, pain started after starting Plavix.  Denies any hematuria, denies any urinary complaints, continues to have right flank pain and he has been taking Tylenol, which is making his skin dry so he decided to come to the emergency room.    Patient has right CVA tenderness, no spinal tenderness, I am going to do a UA and abdominal CT with out contrast to look at his kidney, want to make sure he does not have any retroperitoneal hematoma or something developing after starting the Plavix.  His vital signs are stable, his daughter says that he usually runs low blood pressure but his blood pressure is in safe range today,    Amount and/or Complexity of Data Reviewed  Radiology: ordered.    Risk  Prescription drug management.                                      Clinical Impression:  Final diagnoses:  [S39.012A] Back strain, initial encounter (Primary)          ED Disposition Condition    Discharge Stable          ED Prescriptions       Medication Sig Dispense Start Date End Date Auth. Provider    tiZANidine (ZANAFLEX) 4 MG tablet Take 1 tablet (4 mg total) by mouth every 6 (six) hours as needed. 30 tablet 1/13/2024 1/23/2024 Alvina Carolina MD          Follow-up Information       Follow up With Specialties Details Why Contact Info    James Marsh MD Internal Medicine In 2 days  [address] [facility] [address]  [place] [employer], [facility] [address]  [phone]               Alvina Carolina MD  01/13/24 2587

## 2024-03-08 ENCOUNTER — HOSPITAL ENCOUNTER (OUTPATIENT)
Dept: RADIOLOGY | Facility: HOSPITAL | Age: 89
Discharge: HOME | End: 2024-03-08
Attending: INTERNAL MEDICINE
Payer: MEDICARE

## 2024-03-08 DIAGNOSIS — R05.1 ACUTE COUGH: ICD-10-CM

## 2024-03-08 PROCEDURE — 71046 X-RAY EXAM CHEST 2 VIEWS: CPT | Mod: TC

## 2025-07-13 ENCOUNTER — HOSPITAL ENCOUNTER (INPATIENT)
Facility: HOSPITAL | Age: OVER 89
LOS: 2 days | Discharge: HOSPICE | DRG: 392 | End: 2025-07-15
Attending: EMERGENCY MEDICINE | Admitting: FAMILY MEDICINE
Payer: MEDICARE

## 2025-07-13 DIAGNOSIS — K52.89 STERCORAL COLITIS: ICD-10-CM

## 2025-07-13 DIAGNOSIS — K56.41 FECAL IMPACTION: ICD-10-CM

## 2025-07-13 DIAGNOSIS — A41.9 SEPSIS, DUE TO UNSPECIFIED ORGANISM, UNSPECIFIED WHETHER ACUTE ORGAN DYSFUNCTION PRESENT: Primary | ICD-10-CM

## 2025-07-13 LAB
ALBUMIN SERPL-MCNC: 2.8 G/DL (ref 3.4–4.8)
ALBUMIN/GLOB SERPL: 0.6 RATIO (ref 1.1–2)
ALP SERPL-CCNC: 111 UNIT/L (ref 40–150)
ALT SERPL-CCNC: 15 UNIT/L (ref 0–55)
ANION GAP SERPL CALC-SCNC: 10 MEQ/L
AST SERPL-CCNC: 16 UNIT/L (ref 11–45)
BACTERIA #/AREA URNS AUTO: ABNORMAL /HPF
BASOPHILS # BLD AUTO: 0.07 X10(3)/MCL
BASOPHILS NFR BLD AUTO: 0.5 %
BILIRUB SERPL-MCNC: 0.3 MG/DL
BILIRUB UR QL STRIP.AUTO: NEGATIVE
BUN SERPL-MCNC: 33 MG/DL (ref 8.4–25.7)
CALCIUM SERPL-MCNC: 9.4 MG/DL (ref 8.8–10)
CHLORIDE SERPL-SCNC: 108 MMOL/L (ref 98–111)
CLARITY UR: CLEAR
CO2 SERPL-SCNC: 26 MMOL/L (ref 23–31)
COLOR UR AUTO: YELLOW
CREAT SERPL-MCNC: 1.19 MG/DL (ref 0.72–1.25)
CREAT/UREA NIT SERPL: 28
EOSINOPHIL # BLD AUTO: 0.06 X10(3)/MCL (ref 0–0.9)
EOSINOPHIL NFR BLD AUTO: 0.5 %
ERYTHROCYTE [DISTWIDTH] IN BLOOD BY AUTOMATED COUNT: 16.6 % (ref 11.5–17)
GFR SERPLBLD CREATININE-BSD FMLA CKD-EPI: 58 ML/MIN/1.73/M2
GLOBULIN SER-MCNC: 4.5 GM/DL (ref 2.4–3.5)
GLUCOSE SERPL-MCNC: 171 MG/DL (ref 75–121)
GLUCOSE UR QL STRIP: NEGATIVE
HCT VFR BLD AUTO: 29.8 % (ref 42–52)
HGB BLD-MCNC: 9.4 G/DL (ref 14–18)
HGB UR QL STRIP: NEGATIVE
IMM GRANULOCYTES # BLD AUTO: 0.03 X10(3)/MCL (ref 0–0.04)
IMM GRANULOCYTES NFR BLD AUTO: 0.2 %
KETONES UR QL STRIP: ABNORMAL
LACTATE SERPL-SCNC: 3.4 MMOL/L (ref 0.5–2.2)
LEUKOCYTE ESTERASE UR QL STRIP: NEGATIVE
LYMPHOCYTES # BLD AUTO: 3.56 X10(3)/MCL (ref 0.6–4.6)
LYMPHOCYTES NFR BLD AUTO: 27.2 %
MCH RBC QN AUTO: 30.8 PG (ref 27–31)
MCHC RBC AUTO-ENTMCNC: 31.5 G/DL (ref 33–36)
MCV RBC AUTO: 97.7 FL (ref 80–94)
MONOCYTES # BLD AUTO: 1.64 X10(3)/MCL (ref 0.1–1.3)
MONOCYTES NFR BLD AUTO: 12.5 %
MUCOUS THREADS URNS QL MICRO: ABNORMAL /LPF
NEUTROPHILS # BLD AUTO: 7.74 X10(3)/MCL (ref 2.1–9.2)
NEUTROPHILS NFR BLD AUTO: 59.1 %
NITRITE UR QL STRIP: NEGATIVE
NRBC BLD AUTO-RTO: 0.2 %
PH UR STRIP: 6 [PH]
PLATELET # BLD AUTO: 243 X10(3)/MCL (ref 130–400)
PMV BLD AUTO: 11.8 FL (ref 7.4–10.4)
POTASSIUM SERPL-SCNC: 4.7 MMOL/L (ref 3.5–5.1)
PROT SERPL-MCNC: 7.3 GM/DL (ref 5.8–7.6)
PROT UR QL STRIP: ABNORMAL
RBC # BLD AUTO: 3.05 X10(6)/MCL (ref 4.7–6.1)
RBC #/AREA URNS AUTO: ABNORMAL /HPF
SODIUM SERPL-SCNC: 144 MMOL/L (ref 136–145)
SP GR UR STRIP.AUTO: 1.02 (ref 1–1.03)
SQUAMOUS #/AREA URNS AUTO: ABNORMAL /HPF
UROBILINOGEN UR STRIP-ACNC: 0.2
WBC # BLD AUTO: 13.1 X10(3)/MCL (ref 4.5–11.5)
WBC #/AREA URNS AUTO: ABNORMAL /HPF

## 2025-07-13 PROCEDURE — 80053 COMPREHEN METABOLIC PANEL: CPT | Performed by: EMERGENCY MEDICINE

## 2025-07-13 PROCEDURE — 87040 BLOOD CULTURE FOR BACTERIA: CPT | Performed by: EMERGENCY MEDICINE

## 2025-07-13 PROCEDURE — 85025 COMPLETE CBC W/AUTO DIFF WBC: CPT | Performed by: EMERGENCY MEDICINE

## 2025-07-13 PROCEDURE — 63600175 PHARM REV CODE 636 W HCPCS: Performed by: EMERGENCY MEDICINE

## 2025-07-13 PROCEDURE — 81003 URINALYSIS AUTO W/O SCOPE: CPT | Performed by: EMERGENCY MEDICINE

## 2025-07-13 PROCEDURE — 96365 THER/PROPH/DIAG IV INF INIT: CPT

## 2025-07-13 PROCEDURE — 99900035 HC TECH TIME PER 15 MIN (STAT)

## 2025-07-13 PROCEDURE — 96375 TX/PRO/DX INJ NEW DRUG ADDON: CPT

## 2025-07-13 PROCEDURE — 25000003 PHARM REV CODE 250: Performed by: EMERGENCY MEDICINE

## 2025-07-13 PROCEDURE — 11000001 HC ACUTE MED/SURG PRIVATE ROOM

## 2025-07-13 PROCEDURE — 83605 ASSAY OF LACTIC ACID: CPT | Performed by: EMERGENCY MEDICINE

## 2025-07-13 PROCEDURE — 25500020 PHARM REV CODE 255: Performed by: EMERGENCY MEDICINE

## 2025-07-13 PROCEDURE — 94761 N-INVAS EAR/PLS OXIMETRY MLT: CPT

## 2025-07-13 PROCEDURE — 99285 EMERGENCY DEPT VISIT HI MDM: CPT | Mod: 25

## 2025-07-13 PROCEDURE — 96361 HYDRATE IV INFUSION ADD-ON: CPT

## 2025-07-13 RX ORDER — SODIUM CHLORIDE 0.9 % (FLUSH) 0.9 %
10 SYRINGE (ML) INJECTION
Status: DISCONTINUED | OUTPATIENT
Start: 2025-07-13 | End: 2025-07-15 | Stop reason: HOSPADM

## 2025-07-13 RX ORDER — DIAZEPAM 10 MG/2ML
2 INJECTION INTRAMUSCULAR
Status: COMPLETED | OUTPATIENT
Start: 2025-07-13 | End: 2025-07-13

## 2025-07-13 RX ORDER — ENOXAPARIN SODIUM 100 MG/ML
40 INJECTION SUBCUTANEOUS EVERY 24 HOURS
Status: DISCONTINUED | OUTPATIENT
Start: 2025-07-14 | End: 2025-07-15 | Stop reason: HOSPADM

## 2025-07-13 RX ORDER — CEFTRIAXONE 1 G/1
1 INJECTION, POWDER, FOR SOLUTION INTRAMUSCULAR; INTRAVENOUS
Status: COMPLETED | OUTPATIENT
Start: 2025-07-13 | End: 2025-07-13

## 2025-07-13 RX ORDER — IOPAMIDOL 755 MG/ML
100 INJECTION, SOLUTION INTRAVASCULAR
Status: COMPLETED | OUTPATIENT
Start: 2025-07-13 | End: 2025-07-13

## 2025-07-13 RX ORDER — SYRING-NEEDL,DISP,INSUL,0.3 ML 29 G X1/2"
296 SYRINGE, EMPTY DISPOSABLE MISCELLANEOUS
Status: COMPLETED | OUTPATIENT
Start: 2025-07-13 | End: 2025-07-13

## 2025-07-13 RX ORDER — SODIUM CHLORIDE 0.9 G/100ML
500 IRRIGANT IRRIGATION
Status: COMPLETED | OUTPATIENT
Start: 2025-07-13 | End: 2025-07-13

## 2025-07-13 RX ORDER — METRONIDAZOLE 500 MG/100ML
500 INJECTION, SOLUTION INTRAVENOUS
Status: COMPLETED | OUTPATIENT
Start: 2025-07-13 | End: 2025-07-13

## 2025-07-13 RX ORDER — PSEUDOEPHEDRINE/ACETAMINOPHEN 30MG-500MG
100 TABLET ORAL
Status: COMPLETED | OUTPATIENT
Start: 2025-07-13 | End: 2025-07-13

## 2025-07-13 RX ORDER — SODIUM CHLORIDE 9 MG/ML
1000 INJECTION, SOLUTION INTRAVENOUS
Status: COMPLETED | OUTPATIENT
Start: 2025-07-13 | End: 2025-07-13

## 2025-07-13 RX ADMIN — SODIUM CHLORIDE 500 ML: 900 IRRIGANT IRRIGATION at 09:07

## 2025-07-13 RX ADMIN — SODIUM CHLORIDE 1000 ML: 9 INJECTION, SOLUTION INTRAVENOUS at 09:07

## 2025-07-13 RX ADMIN — IOPAMIDOL 100 ML: 755 INJECTION, SOLUTION INTRAVENOUS at 09:07

## 2025-07-13 RX ADMIN — CEFTRIAXONE SODIUM 1 G: 1 INJECTION, POWDER, FOR SOLUTION INTRAMUSCULAR; INTRAVENOUS at 09:07

## 2025-07-13 RX ADMIN — MAGNESIUM CITRATE 296 ML: 1.75 LIQUID ORAL at 09:07

## 2025-07-13 RX ADMIN — METRONIDAZOLE 500 MG: 5 INJECTION, SOLUTION INTRAVENOUS at 10:07

## 2025-07-13 RX ADMIN — Medication 100 ML: at 09:07

## 2025-07-13 RX ADMIN — SODIUM CHLORIDE, POTASSIUM CHLORIDE, SODIUM LACTATE AND CALCIUM CHLORIDE 600 ML: 600; 310; 30; 20 INJECTION, SOLUTION INTRAVENOUS at 09:07

## 2025-07-13 RX ADMIN — SODIUM CHLORIDE 1000 ML: 9 INJECTION, SOLUTION INTRAVENOUS at 11:07

## 2025-07-13 RX ADMIN — DIAZEPAM 2 MG: 5 INJECTION, SOLUTION INTRAMUSCULAR; INTRAVENOUS at 09:07

## 2025-07-14 PROBLEM — A41.9 SEPSIS: Status: RESOLVED | Noted: 2022-07-12 | Resolved: 2025-07-14

## 2025-07-14 PROBLEM — K52.89 STERCORAL COLITIS: Status: RESOLVED | Noted: 2025-07-14 | Resolved: 2025-07-14

## 2025-07-14 PROBLEM — K52.89 STERCORAL COLITIS: Status: ACTIVE | Noted: 2025-07-14

## 2025-07-14 PROBLEM — K56.41 FECAL IMPACTION: Status: ACTIVE | Noted: 2025-07-14

## 2025-07-14 PROBLEM — K56.41 FECAL IMPACTION: Status: RESOLVED | Noted: 2025-07-14 | Resolved: 2025-07-14

## 2025-07-14 LAB
ANION GAP SERPL CALC-SCNC: 5 MEQ/L
BASOPHILS # BLD AUTO: 0.07 X10(3)/MCL
BASOPHILS NFR BLD AUTO: 0.5 %
BUN SERPL-MCNC: 25 MG/DL (ref 8.4–25.7)
CALCIUM SERPL-MCNC: 8.7 MG/DL (ref 8.8–10)
CHLORIDE SERPL-SCNC: 111 MMOL/L (ref 98–111)
CO2 SERPL-SCNC: 27 MMOL/L (ref 23–31)
CREAT SERPL-MCNC: 0.9 MG/DL (ref 0.72–1.25)
CREAT/UREA NIT SERPL: 28
EOSINOPHIL # BLD AUTO: 0.04 X10(3)/MCL (ref 0–0.9)
EOSINOPHIL NFR BLD AUTO: 0.3 %
ERYTHROCYTE [DISTWIDTH] IN BLOOD BY AUTOMATED COUNT: 16.9 % (ref 11.5–17)
GFR SERPLBLD CREATININE-BSD FMLA CKD-EPI: >60 ML/MIN/1.73/M2
GLUCOSE SERPL-MCNC: 116 MG/DL (ref 75–121)
HCT VFR BLD AUTO: 26.7 % (ref 42–52)
HGB BLD-MCNC: 8.6 G/DL (ref 14–18)
IMM GRANULOCYTES # BLD AUTO: 0.05 X10(3)/MCL (ref 0–0.04)
IMM GRANULOCYTES NFR BLD AUTO: 0.4 %
LACTATE SERPL-SCNC: 1.4 MMOL/L (ref 0.5–2.2)
LACTATE SERPL-SCNC: 2.4 MMOL/L (ref 0.5–2.2)
LYMPHOCYTES # BLD AUTO: 2.59 X10(3)/MCL (ref 0.6–4.6)
LYMPHOCYTES NFR BLD AUTO: 18.5 %
MCH RBC QN AUTO: 31.2 PG (ref 27–31)
MCHC RBC AUTO-ENTMCNC: 32.2 G/DL (ref 33–36)
MCV RBC AUTO: 96.7 FL (ref 80–94)
MONOCYTES # BLD AUTO: 1.92 X10(3)/MCL (ref 0.1–1.3)
MONOCYTES NFR BLD AUTO: 13.7 %
NEUTROPHILS # BLD AUTO: 9.32 X10(3)/MCL (ref 2.1–9.2)
NEUTROPHILS NFR BLD AUTO: 66.6 %
NRBC BLD AUTO-RTO: 0 %
PLATELET # BLD AUTO: 207 X10(3)/MCL (ref 130–400)
PMV BLD AUTO: 11.8 FL (ref 7.4–10.4)
POTASSIUM SERPL-SCNC: 4.3 MMOL/L (ref 3.5–5.1)
RBC # BLD AUTO: 2.76 X10(6)/MCL (ref 4.7–6.1)
SODIUM SERPL-SCNC: 143 MMOL/L (ref 136–145)
WBC # BLD AUTO: 13.99 X10(3)/MCL (ref 4.5–11.5)

## 2025-07-14 PROCEDURE — 94761 N-INVAS EAR/PLS OXIMETRY MLT: CPT

## 2025-07-14 PROCEDURE — 27000221 HC OXYGEN, UP TO 24 HOURS

## 2025-07-14 PROCEDURE — 63600175 PHARM REV CODE 636 W HCPCS: Performed by: INTERNAL MEDICINE

## 2025-07-14 PROCEDURE — 51702 INSERT TEMP BLADDER CATH: CPT

## 2025-07-14 PROCEDURE — 11000001 HC ACUTE MED/SURG PRIVATE ROOM

## 2025-07-14 PROCEDURE — 83605 ASSAY OF LACTIC ACID: CPT | Performed by: EMERGENCY MEDICINE

## 2025-07-14 PROCEDURE — 63600175 PHARM REV CODE 636 W HCPCS: Performed by: EMERGENCY MEDICINE

## 2025-07-14 PROCEDURE — 80048 BASIC METABOLIC PNL TOTAL CA: CPT | Performed by: EMERGENCY MEDICINE

## 2025-07-14 PROCEDURE — 25000003 PHARM REV CODE 250: Performed by: INTERNAL MEDICINE

## 2025-07-14 PROCEDURE — 85025 COMPLETE CBC W/AUTO DIFF WBC: CPT | Performed by: EMERGENCY MEDICINE

## 2025-07-14 RX ORDER — ACETAMINOPHEN 325 MG/1
650 TABLET ORAL EVERY 4 HOURS PRN
Status: DISCONTINUED | OUTPATIENT
Start: 2025-07-14 | End: 2025-07-15 | Stop reason: HOSPADM

## 2025-07-14 RX ORDER — ATORVASTATIN CALCIUM 20 MG/1
20 TABLET, FILM COATED ORAL DAILY
Status: DISCONTINUED | OUTPATIENT
Start: 2025-07-14 | End: 2025-07-15 | Stop reason: HOSPADM

## 2025-07-14 RX ORDER — SODIUM CHLORIDE 9 MG/ML
INJECTION, SOLUTION INTRAVENOUS CONTINUOUS
Status: DISCONTINUED | OUTPATIENT
Start: 2025-07-14 | End: 2025-07-15 | Stop reason: HOSPADM

## 2025-07-14 RX ORDER — MUPIROCIN 20 MG/G
OINTMENT TOPICAL 2 TIMES DAILY
Status: DISCONTINUED | OUTPATIENT
Start: 2025-07-14 | End: 2025-07-15 | Stop reason: HOSPADM

## 2025-07-14 RX ORDER — CETIRIZINE HYDROCHLORIDE 10 MG/1
10 TABLET, CHEWABLE ORAL DAILY
COMMUNITY

## 2025-07-14 RX ORDER — LEVOFLOXACIN 500 MG/1
500 TABLET, FILM COATED ORAL DAILY
Qty: 7 TABLET | Refills: 0 | Status: SHIPPED | OUTPATIENT
Start: 2025-07-14 | End: 2025-07-21

## 2025-07-14 RX ORDER — FOLIC ACID 1 MG/1
1000 TABLET ORAL DAILY
Status: DISCONTINUED | OUTPATIENT
Start: 2025-07-14 | End: 2025-07-15 | Stop reason: HOSPADM

## 2025-07-14 RX ORDER — MONTELUKAST SODIUM 10 MG/1
10 TABLET ORAL DAILY
Status: DISCONTINUED | OUTPATIENT
Start: 2025-07-14 | End: 2025-07-15 | Stop reason: HOSPADM

## 2025-07-14 RX ORDER — LIDOCAINE HYDROCHLORIDE 20 MG/ML
JELLY TOPICAL
Status: DISCONTINUED | OUTPATIENT
Start: 2025-07-14 | End: 2025-07-15 | Stop reason: HOSPADM

## 2025-07-14 RX ORDER — MORPHINE SULFATE 4 MG/ML
2 INJECTION, SOLUTION INTRAMUSCULAR; INTRAVENOUS EVERY 6 HOURS PRN
Status: DISCONTINUED | OUTPATIENT
Start: 2025-07-14 | End: 2025-07-15 | Stop reason: HOSPADM

## 2025-07-14 RX ORDER — LEVOFLOXACIN 5 MG/ML
500 INJECTION, SOLUTION INTRAVENOUS
Status: DISCONTINUED | OUTPATIENT
Start: 2025-07-14 | End: 2025-07-15 | Stop reason: HOSPADM

## 2025-07-14 RX ORDER — HYDROCORTISONE 25 MG/G
CREAM TOPICAL 2 TIMES DAILY
Status: DISCONTINUED | OUTPATIENT
Start: 2025-07-14 | End: 2025-07-15 | Stop reason: HOSPADM

## 2025-07-14 RX ORDER — FLUTICASONE PROPIONATE 50 MCG
2 SPRAY, SUSPENSION (ML) NASAL DAILY
Status: DISCONTINUED | OUTPATIENT
Start: 2025-07-14 | End: 2025-07-15 | Stop reason: HOSPADM

## 2025-07-14 RX ORDER — LIDOCAINE HYDROCHLORIDE 20 MG/ML
JELLY TOPICAL
Status: DISCONTINUED | OUTPATIENT
Start: 2025-07-14 | End: 2025-07-14

## 2025-07-14 RX ORDER — LEVOTHYROXINE SODIUM 75 UG/1
75 TABLET ORAL DAILY
Status: DISCONTINUED | OUTPATIENT
Start: 2025-07-14 | End: 2025-07-15 | Stop reason: HOSPADM

## 2025-07-14 RX ORDER — DOCUSATE SODIUM 100 MG/1
100 CAPSULE, LIQUID FILLED ORAL 2 TIMES DAILY
Status: DISCONTINUED | OUTPATIENT
Start: 2025-07-14 | End: 2025-07-15 | Stop reason: HOSPADM

## 2025-07-14 RX ORDER — FAMOTIDINE 20 MG/1
20 TABLET, FILM COATED ORAL DAILY
Status: DISCONTINUED | OUTPATIENT
Start: 2025-07-14 | End: 2025-07-15 | Stop reason: HOSPADM

## 2025-07-14 RX ORDER — TAMSULOSIN HYDROCHLORIDE 0.4 MG/1
0.4 CAPSULE ORAL DAILY
Status: DISCONTINUED | OUTPATIENT
Start: 2025-07-14 | End: 2025-07-15 | Stop reason: HOSPADM

## 2025-07-14 RX ORDER — CLOPIDOGREL BISULFATE 75 MG/1
75 TABLET ORAL DAILY
COMMUNITY

## 2025-07-14 RX ADMIN — SODIUM CHLORIDE, POTASSIUM CHLORIDE, SODIUM LACTATE AND CALCIUM CHLORIDE 1000 ML: 600; 310; 30; 20 INJECTION, SOLUTION INTRAVENOUS at 12:07

## 2025-07-14 RX ADMIN — FAMOTIDINE 20 MG: 20 TABLET, FILM COATED ORAL at 11:07

## 2025-07-14 RX ADMIN — LEVOFLOXACIN 500 MG: 500 INJECTION, SOLUTION INTRAVENOUS at 09:07

## 2025-07-14 RX ADMIN — DOCUSATE SODIUM 100 MG: 100 CAPSULE, LIQUID FILLED ORAL at 08:07

## 2025-07-14 RX ADMIN — LEVOTHYROXINE SODIUM 75 MCG: 75 TABLET ORAL at 11:07

## 2025-07-14 RX ADMIN — SODIUM CHLORIDE: 0.9 INJECTION, SOLUTION INTRAVENOUS at 11:07

## 2025-07-14 RX ADMIN — MUPIROCIN 1 G: 20 OINTMENT TOPICAL at 08:07

## 2025-07-14 RX ADMIN — MONTELUKAST 10 MG: 10 TABLET, FILM COATED ORAL at 11:07

## 2025-07-14 RX ADMIN — ENOXAPARIN SODIUM 40 MG: 40 INJECTION SUBCUTANEOUS at 07:07

## 2025-07-14 RX ADMIN — SODIUM CHLORIDE 500 ML: 0.9 INJECTION, SOLUTION INTRAVENOUS at 09:07

## 2025-07-14 RX ADMIN — LIDOCAINE HYDROCHLORIDE 6 ML: 20 JELLY TOPICAL at 08:07

## 2025-07-14 RX ADMIN — HYDROCORTISONE: 25 CREAM TOPICAL at 10:07

## 2025-07-14 RX ADMIN — MUPIROCIN 1 G: 20 OINTMENT TOPICAL at 11:07

## 2025-07-14 RX ADMIN — MORPHINE SULFATE 2 MG: 4 INJECTION INTRAVENOUS at 11:07

## 2025-07-14 RX ADMIN — DOCUSATE SODIUM 100 MG: 100 CAPSULE, LIQUID FILLED ORAL at 11:07

## 2025-07-14 RX ADMIN — TAMSULOSIN HYDROCHLORIDE 0.4 MG: 0.4 CAPSULE ORAL at 11:07

## 2025-07-14 RX ADMIN — Medication 1000 MCG: at 11:07

## 2025-07-14 NOTE — PLAN OF CARE
Clinic updates sent over to [facility] and Dr. Marsh wanted to have a rep come over to the hospital to meet the pt

## 2025-07-14 NOTE — ED PROVIDER NOTES
Encounter Date: 7/13/2025       History     Chief Complaint   Patient presents with    Rectal Pain     Pt c/o rectal pain/ spasm approx every 3 mins x 1 day. Pt reports small bowel movements the last couple of weeks. Denies abd pain, NVD. Hx of pulmonary fibrosis with continuous home oxygen of 3L nasal cannula.      The patient presents accompanied by his daughter with a chief complaint of rectal pain, onset today.  The patient feels like he needs to have a bowel movement but can not go.  He is having rectal discomfort.  No vomiting.  No fever.  No abdominal pain.  Medical history includes hypertension, peripheral vascular disease, and pulmonary fibrosis.  He has been on home oxygen for about 2 weeks.  He has had a good appetite.  The patient states that he always runs a low blood pressure, his systolic is usually in the 80s.        Review of patient's allergies indicates:  No Known Allergies  Past Medical History:   Diagnosis Date    Carotid stenosis     Hypercholesterolemia     Hypertension     Hypothyroidism     Macular degeneration     Peripheral vascular disease, unspecified     Pulmonary fibrosis      Past Surgical History:   Procedure Laterality Date    CAROTID ENDARTERECTOMY Left     TONSILLECTOMY       Family History   Problem Relation Name Age of Onset    COPD Mother      Cancer Father      Lung cancer Father       Social History[1]  Review of Systems   All other systems reviewed and are negative.      Physical Exam     Initial Vitals [07/13/25 2028]   BP Pulse Resp Temp SpO2   (!) 72/46 (!) 116 20 97.4 °F (36.3 °C) 97 %      MAP       --         Physical Exam    Constitutional:   Vital signs reviewed.  Nursing note reviewed.    General:  The patient is awake and alert, appropriate and interactive.  Frail, cachectic.  Eyes: Conjunctiva are clear.  Corneas appear normal.  EOMI.  ENT: Face, head, external nose, and ears are normal-appearing.  The oropharynx appears normal.  The uvula is midline.  The  posterior pharyngeal elements are symmetric.  Voice is normal.  Mucous membranes moist.  Neck: The neck is nontender and supple with normal range of motion.  Back: The back appears normal with full range of motion.  Respiratory: The respiratory effort is normal.  The lungs are clear to auscultation.  Cardiovascular: Heart sounds are normal.  No murmur or rub.  The rate is tachycardic at approximately 116 beats per minute.  The rhythm is normal.  Peripheral pulses are normal and equal bilaterally.  No edema is present.  Capillary refill is less than 3 seconds.  GI: The abdomen is soft, nondistended, without mass.  There is no tenderness to palpation.  No rebound or guarding.  Bowel sounds are normal.  The liver and spleen are nonpalpable.  Rectal:  Fecal impaction is present.  I tried to break up some of the impaction but the patient was having a good deal of pain during this procedure.  Musculoskeletal: Range of motion of all joints appears normal without pain or tenderness.  Skin: There is no rash.  Neurologic: No focal deficits are noted.           ED Course   Procedures  Labs Reviewed   COMPREHENSIVE METABOLIC PANEL - Abnormal       Result Value    Sodium 144      Potassium 4.7      Chloride 108      CO2 26      Glucose 171 (*)     Blood Urea Nitrogen 33.0 (*)     Creatinine 1.19      Calcium 9.4      Protein Total 7.3      Albumin 2.8 (*)     Globulin 4.5 (*)     Albumin/Globulin Ratio 0.6 (*)     Bilirubin Total 0.3            ALT 15      AST 16      eGFR 58      Anion Gap 10.0      BUN/Creatinine Ratio 28     LACTIC ACID, PLASMA - Abnormal    Lactic Acid Level 3.4 (*)    URINALYSIS, REFLEX TO URINE CULTURE - Abnormal    Color, UA Yellow      Appearance, UA Clear      Specific Gravity, UA 1.020      pH, UA 6.0      Protein, UA 1+ (*)     Glucose, UA Negative      Ketones, UA Trace (*)     Blood, UA Negative      Bilirubin, UA Negative      Urobilinogen, UA 0.2      Nitrites, UA Negative      Leukocyte  Esterase, UA Negative     CBC WITH DIFFERENTIAL - Abnormal    WBC 13.10 (*)     RBC 3.05 (*)     Hgb 9.4 (*)     Hct 29.8 (*)     MCV 97.7 (*)     MCH 30.8      MCHC 31.5 (*)     RDW 16.6      Platelet 243      MPV 11.8 (*)     Neut % 59.1      Lymph % 27.2      Mono % 12.5      Eos % 0.5      Basophil % 0.5      Imm Grans % 0.2      Neut # 7.74      Lymph # 3.56      Mono # 1.64 (*)     Eos # 0.06      Baso # 0.07      Imm Gran # 0.03      NRBC% 0.2     URINALYSIS, MICROSCOPIC - Abnormal    Bacteria, UA None Seen      Mucous, UA Small (*)     RBC, UA 0-5      WBC, UA None Seen      Squamous Epithelial Cells, UA None Seen     BLOOD CULTURE OLG   BLOOD CULTURE OLG   CBC W/ AUTO DIFFERENTIAL    Narrative:     The following orders were created for panel order CBC auto differential.  Procedure                               Abnormality         Status                     ---------                               -----------         ------                     CBC with Differential[[id number]]       Abnormal            Final result                 Please view results for these tests on the individual orders.   LACTIC ACID, PLASMA          Imaging Results              CT Abdomen Pelvis With IV Contrast NO Oral Contrast (Preliminary result)  Result time 07/13/25 21:54:43      Preliminary result by Nitish Lockhart MD (07/13/25 21:54:43)                   Narrative:    START OF REPORT:  Technique: CT of the abdomen and pelvis was performed with axial images as well as sagittal and coronal reconstruction images with intravenous contrast.    Comparison: Comparison is with study dated 2024-01-13 11:33:29.    Clinical History: Rectal pain.    Dosage Information: Automated Exposure Control was utilized.    Findings:  Lines and Tubes: None.  Thorax:  Lungs: No focal infiltrate or consolidation is seen. There is stable pronounced increased reticular opacity throughout the lung bases suggestive of a chronic interstitial lung disease  with a concurrent atypical infectious component not entirely excluded.  Pleura: No effusions or thickening.  Heart: The heart size is within normal limits.  Abdomen:  Abdominal Wall: No abdominal wall pathology is seen.  Liver: The liver appears unremarkable.  Biliary System: No intrahepatic or extrahepatic biliary duct dilatation is seen.  Gallbladder: The gallbladder appears unremarkable.  Pancreas: The pancreas appears unremarkable.  Spleen: The spleen appears unremarkable.  Adrenals: The adrenal glands appear unremarkable.  Kidneys: The kidneys appear unremarkable with no stones cysts masses or hydronephrosis.  Aorta: There is mild there is extensive calcification of the abdominal aorta and its branches.  IVC: Unremarkable.  Bowel:  Esophagus: The visualized esophagus appears unremarkable.  Stomach: The stomach appears unremarkable.  Duodenum: Unremarkable appearing duodenum.  Small Bowel: The small bowel appears unremarkable.  Colon: There is moderate stool in the colon which could reflect an element of constipation. There is pronounced stool in the rectum with associated wall thickening. This is consistent with fecal impaction with associated stercoral colitis.  Appendix: No appendix is identified.  Peritoneum: No intraperitoneal free air or ascites is seen.    Pelvis:  Bladder: The bladder is pronouncedly distended but appears otherwise unremarkable.  Male:  Prostate gland: The prostate gland appears unremarkable.    Bony structures:  Dorsal Spine: There is mild multilevel spondylosis of the visualized dorsal spine.  Bony Pelvis: There is mild degenerative change of the bilateral hips.      Impression:  1. No focal infiltrate or consolidation is seen. There is stable pronounced increased reticular opacity throughout the lung bases suggestive of a chronic interstitial lung disease with a concurrent atypical infectious component not entirely excluded. Correlate with clinical and laboratory findings as regards  additional evaluation and followup.  2. There is moderate stool in the colon which could reflect an element of constipation. There is pronounced stool in the rectum with associated wall thickening. This is consistent with fecal impaction with associated stercoral colitis. Correlate with clinical and laboratory findings as regards additional evaluation and followup.  3. Details and other findings as discussed above.                                         X-Ray Chest 1 View (In process)                      Medications   metronidazole IVPB 500 mg (500 mg Intravenous New Bag 7/13/25 2252)   sodium chloride 0.9% bolus 1,000 mL 1,000 mL (0 mLs Intravenous Stopped 7/13/25 2231)   diazePAM injection 2 mg (2 mg Intravenous Given 7/13/25 2113)   glycerin 99.5% topical solution 100 mL (100 mLs Rectal Given 7/13/25 2136)     And   magnesium citrate solution 296 mL (296 mLs Rectal Given 7/13/25 2136)     And   sodium chloride 0.9% irrigation 500 mL (500 mLs Rectal Given 7/13/25 2136)   iopamidoL (ISOVUE-370) injection 100 mL (100 mLs Intravenous Given 7/13/25 2122)   lactated ringers bolus 600 mL (600 mLs Intravenous New Bag 7/13/25 2154)   cefTRIAXone injection 1 g (1 g Intravenous Given 7/13/25 2154)     Medical Decision Making  I rechecked the patient multiple times during his ED stay, most recently at 11:30 p.m..  He is resting comfortably.  He states that his normal blood pressure is in the 80s.  His blood pressure has improved and his MAP is within an acceptable range.  He is septic from his stercoral colitis.  He has been treated with a 30 mL per kg fluid bolus along with broad-spectrum antibiotics.  His tissue perfusion at 11:30 p.m. is normal.  The plan includes admission for bowel cleansing and IV antibiotics.  The patient and his family understand and are in agreement with this plan of care.      In order to obtain a more complete history, additional information was obtained from:  The patient's daughter.    I  reviewed the following prior external notes:  None.    Discussion with other physicians/healthcare providers:  I spoke with Dr. Martínez at 10:20 p.m. and the patient was accepted for admission.  He recommended IV fluids and antibiotics.    Decision to admit/transfer/discharge:  After my evaluation of the patient and interpretation of all relevant information, the decision has been made to admit the patient.    After discussion with the patient/decision maker(s), the patient is full code.    I independently reviewed and interpreted any laboratory tests, radiographic images, EKGs, rhythm strips, and other diagnostic tests that were obtained during this Emergency Department visit.    Drug/medication management:  Parenteral controlled substances and other dangerous medications, if administered, can be found in the order section of this chart.  I reviewed the patient's home medications and made changes/adjustments as medically indicated.  Any new medications are documented under the prescription section of this chart.    Social determinants of health addressed during this ED visit:  The patient/caregiver knowledge deficit about today's condition and treatment plan was addressed by me through appropriate patient/caregiver education.      Procedures/plan for surgery:  None.    Critical care:  Time spent: 45 minutes.  The high probability of significant deterioration in the patient's condition required the highest level of my care and the need to intervene urgently.  I provided critical care services requiring my direct management to review nursing notes, previous records, orders, repeat evaluations, continuous assessment, ongoing care, and transfer to the admitting physician.  This time excludes time for separately billed procedures.    Amount and/or Complexity of Data Reviewed  Labs: ordered.  Radiology: ordered.    Risk  OTC drugs.  Prescription drug management.                                          Clinical  Impression:  Final diagnoses:  [A41.9] Sepsis, due to unspecified organism, unspecified whether acute organ dysfunction present (Primary)  [K52.89] Stercoral colitis  [K56.41] Fecal impaction          ED Disposition Condition    Admit                       [1]   Social History  Tobacco Use    Smoking status: Never    Smokeless tobacco: Never   Substance Use Topics    Alcohol use: Not Currently    Drug use: Never        Phani Zayas MD  07/14/25 6569

## 2025-07-14 NOTE — NURSING
Please give medication one at a time. Patient was unable to tolerate all at one time and choked on them. Nurse and daughter Regla at bedside and assisted with clearing his throat.         Patient is sitting up in bed, alert, and oriented x4.

## 2025-07-14 NOTE — PLAN OF CARE
07/14/25 1526   Discharge Assessment   Assessment Type Discharge Planning Assessment   Confirmed/corrected address, phone number and insurance Yes   Confirmed Demographics Correct on Facesheet   Source of Information family   People in Home child(kristen), adult   Do you expect to return to your current living situation? Yes   Do you have help at home or someone to help you manage your care at home? Yes   Who are your caregiver(s) and their phone number(s)? fly   Prior to hospitilization cognitive status: Alert/Oriented;No Deficits   Current cognitive status: Alert/Oriented;No Deficits   Equipment Currently Used at Home walker, rolling   Do you currently have service(s) that help you manage your care at home? No   Do you take prescription medications? Yes   Do you have prescription coverage? Yes   Do you have any problems affording any of your prescribed medications? No   Is the patient taking medications as prescribed? yes   Who is going to help you get home at discharge? daughter   How do you get to doctors appointments? family or friend will provide   Are you on dialysis? No   Do you take coumadin? No   Discharge Plan A Hospice/home   Discharge Plan B Hospice/home   DME Needed Upon Discharge  other (see comments)  (pending what hospice will set up at home)   Discharge Plan discussed with: Adult children   Transition of Care Barriers None   Physical Activity   On average, how many days per week do you engage in moderate to strenuous exercise (like a brisk walk)? 0 days   On average, how many minutes do you engage in exercise at this level? 0 min   Financial Resource Strain   How hard is it for you to pay for the very basics like food, housing, medical care, and heating? Not very   Housing Stability   In the last 12 months, was there a time when you were not able to pay the mortgage or rent on time? N   At any time in the past 12 months, were you homeless or living in a shelter (including now)? N   Transportation  Needs   In the past 12 months, has lack of transportation kept you from medical appointments or from getting medications? no   In the past 12 months, has lack of transportation kept you from meetings, work, or from getting things needed for daily living? No   Food Insecurity   Within the past 12 months, the food you bought just didn't last and you didn't have money to get more. Never true   Stress   Do you feel stress - tense, restless, nervous, or anxious, or unable to sleep at night because your mind is troubled all the time - these days? Only a littl   Social Isolation   How often do you feel lonely or isolated from those around you?  Never   Alcohol Use   Q1: How often do you have a drink containing alcohol? Never   Q2: How many drinks containing alcohol do you have on a typical day when you are drinking? None   Q3: How often do you have six or more drinks on one occasion? Never   Utilities   In the past 12 months has the electric, gas, oil, or water company threatened to shut off services in your home? No   Health Literacy   How often do you need to have someone help you when you read instructions, pamphlets, or other written material from your doctor or pharmacy? Sometimes     [facility] supposed to eval patient today and we will d/c once it is arranged.

## 2025-07-14 NOTE — PLAN OF CARE
Problem: Skin Injury Risk Increased  Goal: Skin Health and Integrity  Outcome: Progressing     Problem: Adult Inpatient Plan of Care  Goal: Plan of Care Review  Outcome: Progressing  Goal: Patient-Specific Goal (Individualized)  Outcome: Progressing  Goal: Absence of Hospital-Acquired Illness or Injury  Outcome: Progressing  Goal: Optimal Comfort and Wellbeing  Outcome: Progressing  Goal: Readiness for Transition of Care  Outcome: Progressing     Problem: Infection  Goal: Absence of Infection Signs and Symptoms  Outcome: Progressing     Problem: Fall Injury Risk  Goal: Absence of Fall and Fall-Related Injury  Outcome: Progressing     Problem: Sepsis/Septic Shock  Goal: Optimal Coping  Outcome: Progressing  Goal: Absence of Bleeding  Outcome: Progressing  Goal: Blood Glucose Level Within Targeted Range  Outcome: Progressing  Goal: Absence of Infection Signs and Symptoms  Outcome: Progressing  Goal: Optimal Nutrition Intake  Outcome: Progressing

## 2025-07-15 VITALS
SYSTOLIC BLOOD PRESSURE: 95 MMHG | RESPIRATION RATE: 20 BRPM | HEART RATE: 87 BPM | WEIGHT: 121.94 LBS | BODY MASS INDEX: 17.07 KG/M2 | HEIGHT: 71 IN | OXYGEN SATURATION: 90 % | DIASTOLIC BLOOD PRESSURE: 58 MMHG | TEMPERATURE: 98 F

## 2025-07-15 LAB
ABO + RH BLD: NORMAL
ANION GAP SERPL CALC-SCNC: 4 MEQ/L
BASOPHILS # BLD AUTO: 0.07 X10(3)/MCL
BASOPHILS NFR BLD AUTO: 0.7 %
BLD PROD TYP BPU: NORMAL
BLOOD UNIT EXPIRATION DATE: NORMAL
BLOOD UNIT TYPE CODE: 7300
BUN SERPL-MCNC: 21 MG/DL (ref 8.4–25.7)
CALCIUM SERPL-MCNC: 7.6 MG/DL (ref 8.8–10)
CHLORIDE SERPL-SCNC: 108 MMOL/L (ref 98–111)
CO2 SERPL-SCNC: 26 MMOL/L (ref 23–31)
CREAT SERPL-MCNC: 0.93 MG/DL (ref 0.72–1.25)
CREAT/UREA NIT SERPL: 23
CROSSMATCH INTERPRETATION: NORMAL
DISPENSE STATUS: NORMAL
EOSINOPHIL # BLD AUTO: 0.1 X10(3)/MCL (ref 0–0.9)
EOSINOPHIL NFR BLD AUTO: 1 %
ERYTHROCYTE [DISTWIDTH] IN BLOOD BY AUTOMATED COUNT: 17.2 % (ref 11.5–17)
GFR SERPLBLD CREATININE-BSD FMLA CKD-EPI: >60 ML/MIN/1.73/M2
GLUCOSE SERPL-MCNC: 85 MG/DL (ref 75–121)
GROUP & RH: NORMAL
HCT VFR BLD AUTO: 22.1 % (ref 42–52)
HGB BLD-MCNC: 7 G/DL (ref 14–18)
IMM GRANULOCYTES # BLD AUTO: 0.04 X10(3)/MCL (ref 0–0.04)
IMM GRANULOCYTES NFR BLD AUTO: 0.4 %
INDIRECT COOMBS: NORMAL
LYMPHOCYTES # BLD AUTO: 2.92 X10(3)/MCL (ref 0.6–4.6)
LYMPHOCYTES NFR BLD AUTO: 28.9 %
MAGNESIUM SERPL-MCNC: 2 MG/DL (ref 1.6–2.6)
MCH RBC QN AUTO: 30.7 PG (ref 27–31)
MCHC RBC AUTO-ENTMCNC: 31.7 G/DL (ref 33–36)
MCV RBC AUTO: 96.9 FL (ref 80–94)
MONOCYTES # BLD AUTO: 1.36 X10(3)/MCL (ref 0.1–1.3)
MONOCYTES NFR BLD AUTO: 13.5 %
NEUTROPHILS # BLD AUTO: 5.61 X10(3)/MCL (ref 2.1–9.2)
NEUTROPHILS NFR BLD AUTO: 55.5 %
NRBC BLD AUTO-RTO: 0.2 %
PHOSPHATE SERPL-MCNC: 2.7 MG/DL (ref 2.3–4.7)
PLATELET # BLD AUTO: 173 X10(3)/MCL (ref 130–400)
PMV BLD AUTO: 11.6 FL (ref 7.4–10.4)
POTASSIUM SERPL-SCNC: 4.4 MMOL/L (ref 3.5–5.1)
RBC # BLD AUTO: 2.28 X10(6)/MCL (ref 4.7–6.1)
SODIUM SERPL-SCNC: 138 MMOL/L (ref 136–145)
SPECIMEN OUTDATE: NORMAL
UNIT NUMBER: NORMAL
WBC # BLD AUTO: 10.1 X10(3)/MCL (ref 4.5–11.5)

## 2025-07-15 PROCEDURE — 25000003 PHARM REV CODE 250: Performed by: INTERNAL MEDICINE

## 2025-07-15 PROCEDURE — 86923 COMPATIBILITY TEST ELECTRIC: CPT | Performed by: INTERNAL MEDICINE

## 2025-07-15 PROCEDURE — 25000242 PHARM REV CODE 250 ALT 637 W/ HCPCS: Performed by: INTERNAL MEDICINE

## 2025-07-15 PROCEDURE — 36415 COLL VENOUS BLD VENIPUNCTURE: CPT | Performed by: INTERNAL MEDICINE

## 2025-07-15 PROCEDURE — 27000221 HC OXYGEN, UP TO 24 HOURS

## 2025-07-15 PROCEDURE — 86850 RBC ANTIBODY SCREEN: CPT | Performed by: INTERNAL MEDICINE

## 2025-07-15 PROCEDURE — 36430 TRANSFUSION BLD/BLD COMPNT: CPT

## 2025-07-15 PROCEDURE — P9016 RBC LEUKOCYTES REDUCED: HCPCS | Performed by: INTERNAL MEDICINE

## 2025-07-15 PROCEDURE — 84100 ASSAY OF PHOSPHORUS: CPT | Performed by: INTERNAL MEDICINE

## 2025-07-15 PROCEDURE — 30233N1 TRANSFUSION OF NONAUTOLOGOUS RED BLOOD CELLS INTO PERIPHERAL VEIN, PERCUTANEOUS APPROACH: ICD-10-PCS | Performed by: INTERNAL MEDICINE

## 2025-07-15 PROCEDURE — 83735 ASSAY OF MAGNESIUM: CPT | Performed by: INTERNAL MEDICINE

## 2025-07-15 PROCEDURE — 80048 BASIC METABOLIC PNL TOTAL CA: CPT | Performed by: INTERNAL MEDICINE

## 2025-07-15 PROCEDURE — 94761 N-INVAS EAR/PLS OXIMETRY MLT: CPT

## 2025-07-15 PROCEDURE — 63600175 PHARM REV CODE 636 W HCPCS: Performed by: INTERNAL MEDICINE

## 2025-07-15 PROCEDURE — 85025 COMPLETE CBC W/AUTO DIFF WBC: CPT | Performed by: INTERNAL MEDICINE

## 2025-07-15 RX ORDER — HYDROCODONE BITARTRATE AND ACETAMINOPHEN 500; 5 MG/1; MG/1
TABLET ORAL
Status: DISCONTINUED | OUTPATIENT
Start: 2025-07-15 | End: 2025-07-15 | Stop reason: HOSPADM

## 2025-07-15 RX ADMIN — DOCUSATE SODIUM 100 MG: 100 CAPSULE, LIQUID FILLED ORAL at 10:07

## 2025-07-15 RX ADMIN — TAMSULOSIN HYDROCHLORIDE 0.4 MG: 0.4 CAPSULE ORAL at 10:07

## 2025-07-15 RX ADMIN — HYDROCORTISONE: 25 CREAM TOPICAL at 10:07

## 2025-07-15 RX ADMIN — FAMOTIDINE 20 MG: 20 TABLET, FILM COATED ORAL at 10:07

## 2025-07-15 RX ADMIN — MUPIROCIN 1 G: 20 OINTMENT TOPICAL at 10:07

## 2025-07-15 RX ADMIN — ATORVASTATIN CALCIUM 20 MG: 20 TABLET, FILM COATED ORAL at 10:07

## 2025-07-15 RX ADMIN — FLUTICASONE PROPIONATE 100 MCG: 50 SPRAY, METERED NASAL at 10:07

## 2025-07-15 RX ADMIN — MORPHINE SULFATE 2 MG: 4 INJECTION INTRAVENOUS at 10:07

## 2025-07-15 RX ADMIN — LEVOTHYROXINE SODIUM 75 MCG: 75 TABLET ORAL at 10:07

## 2025-07-15 RX ADMIN — MONTELUKAST 10 MG: 10 TABLET, FILM COATED ORAL at 10:07

## 2025-07-15 RX ADMIN — Medication 1000 MCG: at 10:07

## 2025-07-15 NOTE — H&P
[facility]                     [address] [id number]    PATIENT NAME:       TONY HINKLE  YOB: 1934  CSN:                [id number]   MRN:                [id number]  ADMIT DATE:         07/13/2025 20:35:00  PHYSICIAN:          James Marsh MD                        HISTORY AND PHYSICAL      CODE STATUS:  Full code.    CHIEF COMPLAINT:  The patient was brought by the family because of pain in the   rectum and spasm.  As per the patient, he has been having small bowel movements   for the last couple of weeks and has not been eating and drinking well.    HISTORY OF PRESENT ILLNESS:  Mr. Tony Hinkle is a pleasant 89-year-old   gentleman, who has history of anorexia for last few months, weight loss, and has   been not eating and drinking well.  In fact, on Friday, the patient's family   agreed for the patient to put on hospice.  [facility] was arranged to do a   visit and they did visit the patient's family and on Monday, which is today,   everything was supposed to be done to admit the patient to the hospice services,   but the patient was brought to the emergency room because of rectal pain and   spasm and the radioimaging shows that the patient had a large amount of stool   burden with fecal impaction, which was the cause of the pain and also sepsis   protocol was initiated and he did have stercoral colitis, but as far as his   white cell count was concerned it was 13.10 with no significant left shift.  He   did not have any fever.  His urine was unremarkable.  His lactic acid came down   quickly from 3.4-1.4 and by the time I saw the patient in the morning, the   patient was eating and drinking and back to his normal self.  He had passed   bowel movements and after that he had no complaint at all.  For that reason, I   have not started the patient on antibiotics.  The patient's family also wished    the desire that the hospice be initiated tomorrow and I have spoken to the case   manager.  [facility] has already spoken to the patient and the patient will be   discharged tomorrow to be admitted to [facility] as per the family wishes.    Besides that, the patient did not have any system-specific complaint because   after his fecal impaction was relieved, he is nearly back to normal.    PAST MEDICAL HISTORY:  Significant for carotid artery stenosis, dyslipidemia,   TIA, hypertension, hypothyroidism, eczema, macular degeneration, peripheral   vascular disease, and pulmonary fibrosis, on home oxygen.  History of BPH with   LUTS and allergic rhinitis and constipation and dyslipidemia.  History of GERD,   history of weight loss, and history of increased MCV anemia.    PAST SURGICAL HISTORY:  Includes carotid endarterectomy and tonsillectomy.    FAMILY HISTORY:  Pertinent for his mother having COPD.  Father having cancer of   lung.    ALLERGIES:  TO INDUSTRIAL OILS AND PRODUCTS IN THE FORM OF CONTACT DERMATITIS,   BUT OTHERWISE NONE.     MEDICATIONS:  On which the patient is on are:  1. Lipitor 20 mg daily.  2. Colace 100 mg b.i.d.  3. Famotidine 20 mg daily.  4. Folic acid 1000 mcg daily.  5. Fluticasone nasal spray 100 mcg 2 sprays each nostril daily.  6. Levothyroxine 75 mcg daily.  7. Montelukast 10 mg daily.  8. Tamsulosin capsule 0.4 mg daily.    REVIEW OF SYSTEMS:  As mentioned in History of Present Illness.    PHYSICAL EXAMINATION:  GENERAL:  The patient is alert and oriented x3.  He is eating when I spoke to   him, his 2 daughters are present there.  VITAL SIGNS:  As follows; blood pressure is 80/43, 83 pulse, temperature 97.8,   O2 sat 100%.  HEENT:  Head is normocephalic.  Pupils bilaterally reactive, equal in size.    Mild conjunctival pallor.  No scleral icterus.  Trachea is midline.  CHEST:  Has bilateral diffuse rhonchi and crepitation.  CVS:  First and second sounds are heard normally without any  murmurs.   ABDOMEN:  Soft, scaphoid, nontender.  EXTREMITIES:  Devoid of edema, cyanosis, or clubbing.    LABS AND INVESTIGATIONS:  As follows; white cell count 13.9, hemoglobin 8.6,   hematocrit 26.7, platelet count 207.  Sodium 143, potassium 4.3, chloride 111,   bicarb 27, BUN 25, creatinine 0.90, GFR more than 60, glucose 116, calcium 8.7.    Lactic acid 3.4, 2.4, and 1.4.  CAT scan of the abdomen showed findings of   constipation with prominent dense fecal bolus in the rectum measuring 8 cm, mild   prostatomegaly, extensive atherosclerosis, hazy interstitial opacities of the   lung bases suggesting a chronic process, superimposed acute component cannot be   excluded.  Chest x-ray, scattered patchy hazy opacities throughout the lungs   bilaterally which have mildly progressed since the prior exam suggesting a   superimposed acute infiltrate on chronic interstitial changes.    IMPRESSION:    1. Rectal pain secondary to fecal bolus.  The patient relieved of the pain after   he has passed the bolus.  2. Increased lactic acid.  Chest x-ray showing some infiltrate, possible   pneumonitis with increased lactic acid.  We will put the patient on Levaquin.  3. Hypertension, probably secondary to poor oral intake, ongoing infection in   the lungs.  4. History of pulmonary fibrosis on home oxygen.  5. History of weight loss.  6. History of anemia of multifactorial origin.  7. History of allergic rhinitis.  8. History of BPH with lower urinary tract symptoms.  9. History of hypertension.  10. History of gastroesophageal reflux disease.  11. History of transient ischemic attack.  12. History of increased mean corpuscular volume anemia.    PLAN:  Admit the patient.  Fluid bolus will be given now as the patient has   developed some hypotension after initial stabilization of the blood pressure.    The patient did not have any colitis, but might be the patient has pneumonitis.    We will put him on Levaquin.  Hospice has been  consulted as per the family   wishes whom they were supposed to meet today as an outpatient and if the patient   is stable, can be discharged tomorrow most likely to the [facility]   at home.  Continue with essential home medications.  Follow with the labs in the   morning.    Pleasure taking care of Mr. Tony Mckeon during his stay at [facility] on the 3rd floor.        ______________________________  James Marsh MD    SS/SAMMIE  DD:  07/14/2025  Time:  07:19PM  DT:  07/14/2025  Time:  08:02PM  Job #:  [id number]      HISTORY AND PHYSICAL

## 2025-07-15 NOTE — PLAN OF CARE
07/15/25 1529   Final Note   Assessment Type Final Discharge Note   Anticipated Discharge Disposition [facility] Resources/Appts/Education Provided Post-Acute resouces added to AVS   Post-Acute Status   Post-Acute Authorization Hospice   Hospice Status Set-up Complete/Auth obtained   Discharge Delays None known at this time     D/c home with [facility] today.

## 2025-07-15 NOTE — DISCHARGE INSTRUCTIONS
Discharge instructions reviewed with patient and daughter at bedside      Please follow up with [facility] upon discharge       THANK YOU FOR CHOOSING [facility] FOR YOUR CARE!!          IT WAS FUN AND A PLEASURE TAKING CARE OF YOU!!!

## 2025-07-16 NOTE — DISCHARGE SUMMARY
[facility]                     [address] [address]    PATIENT NAME:       TONY HINKLE  YOB: 1934  CSN:                [id number]   MRN:                [id number]  ADMIT DATE:         07/13/2025 20:35:00  PHYSICIAN:          James Marsh MD                          DISCHARGE SUMMARY    DATE OF DISCHARGE:  07/15/2025 18:45:00    DISCHARGE DIAGNOSES:    1. Rectal pain secondary to fecal bolus with coexistent stercoral colitis   without any leukocytosis.  The patient passed the bolus with the help of   cathartics, pain relieved, and given with local lidocaine.  2. Increased lactic acid with chest x-ray showing pneumonitis and patient   developing hypertension, responding well to IV fluids and Levaquin.  The patient   being discharged on Levaquin.  3. Anemia of multifactorial origin.  Patient transfused a unit of PRBC.  4. Weight loss, pulmonary fibrosis, progressive decline in general health, very   decreased mobility.  The patient's family opting for hospice a week ago before   this admission.  The patient is now being discharged with [facility] to home.  5. History of hypertension.  6. History of eczema.  7. History of transient ischemic attack.  8. History of increased mean corpuscular volume anemia.  9. History of allergic rhinitis.  10. History of carotid artery disease, status post endarterectomy.  11. Hypotension, responding to normal saline bolus.  12. History of allergic rhinitis.  13. History of benign prostatic hyperplasia with lower urinary tract symptoms.    HOSPITAL COURSE:  Mr. Tony Hinkle is a pleasant 89-year-old male who has   been having progressive history of anorexia, weight loss, increasing debility,   dependence on home oxygen for pulmonary fibrosis, who was supposed to be   admitted on Monday, the day he was admitted to [facility] after the talks have   been established  between the [facility] and family.  Unfortunately, patient   developed pain in the abdomen, most so in the rectum, was brought to the   emergency room where he was found to have 8 cm fecal bolus in the rectum, which   was the cause of the pain with accompanying inflammation of localized colitis.    He also had increased lactic acid with pneumonitis on the chest x-ray.  The   patient was admitted, cathartics were given, fecal bolus was relieved.  The   patient was back to his normal self.  Antibiotics were given in the form of   Levaquin.  Fluid boluses were given to combat the hypotension.  Home medications   were continued and [facility] was contacted.  They met the patient in the   hospital and as I was going to discharge the patient in the morning, he did   develop some anemia.  A PRBC unit was given.  After the PRBC was given, the   patient is being discharged to home with hospice.  The patient will go with   Levaquin as the antibiotic for his pneumonic process at 500 mg daily.  For pain   relief, we used p.r.n. morphine sulfate, which gave the patient good relief.    DISCHARGE MEDICATIONS:  The medications on which the patient is going home are   as follows:  1. Levofloxacin 500 mg daily for 7 days.  2. Atorvastatin 20 mg daily.  3. Cetirizine 10 mg daily.  4. Plavix 75 mg daily.  5. Colace 100 mg b.i.d.  6. Famotidine 20 mg daily.  7. Flonase nasal spray two sprays into each nostril daily.  8. Folic acid 1 mg daily.  9. Hydrocortisone 2.5% cream applied locally.  10. Levothyroxine 88 mcg daily.  11. Singulair 10 mg daily.  12. Senna 8.6 mg tablet daily.  13. Tamsulosin 0.4 mg daily.    LABS AND INVESTIGATIONS:  WBC 10.1, hemoglobin 7.0, hematocrit 22.1, MCV is   96.9, and platelet count adequate.  The patient was transfused a unit of PRBC.    Sodium 138, potassium 4.4, chloride 108, bicarb 26, BUN 21, creatinine 0.93, GFR   more than 60, calcium is 7.6.  CAT scan of the abdomen shows findings of    constipation with prominent dense fecal bolus at the rectum measuring 8 cm in   diameter, mild prostatomegaly, extensive atherosclerosis, hazy interstitial   opacity in the lung bases suggesting a chronic process, a pneumonic process   cannot be ruled out.  Urine unremarkable.  Chest x-ray suggesting the findings   of pneumonitis and interstitial lung disease.    It was pleasure taking care of Mr. Tony Mckeon during his stay at [facility].        ______________________________  James Marsh MD    SS/AQS  DD:  07/15/2025  Time:  08:18PM  DT:  07/15/2025  Time:  10:18PM  Job #:  [id number]    cc:   [facility]        DISCHARGE SUMMARY

## 2025-07-19 LAB
BACTERIA BLD CULT: NORMAL
BACTERIA BLD CULT: NORMAL